# Patient Record
Sex: FEMALE | Race: BLACK OR AFRICAN AMERICAN | NOT HISPANIC OR LATINO | URBAN - METROPOLITAN AREA
[De-identification: names, ages, dates, MRNs, and addresses within clinical notes are randomized per-mention and may not be internally consistent; named-entity substitution may affect disease eponyms.]

---

## 2017-06-12 ENCOUNTER — EMERGENCY (EMERGENCY)
Facility: HOSPITAL | Age: 82
LOS: 1 days | Discharge: HOME | End: 2017-06-12
Attending: EMERGENCY MEDICINE | Admitting: EMERGENCY MEDICINE
Payer: MEDICARE

## 2017-06-12 VITALS
DIASTOLIC BLOOD PRESSURE: 78 MMHG | TEMPERATURE: 98 F | SYSTOLIC BLOOD PRESSURE: 182 MMHG | OXYGEN SATURATION: 95 % | WEIGHT: 134.92 LBS | HEART RATE: 54 BPM | RESPIRATION RATE: 16 BRPM

## 2017-06-12 DIAGNOSIS — R42 DIZZINESS AND GIDDINESS: ICD-10-CM

## 2017-06-12 DIAGNOSIS — R35.0 FREQUENCY OF MICTURITION: ICD-10-CM

## 2017-06-12 DIAGNOSIS — Z79.899 OTHER LONG TERM (CURRENT) DRUG THERAPY: ICD-10-CM

## 2017-06-12 DIAGNOSIS — I10 ESSENTIAL (PRIMARY) HYPERTENSION: ICD-10-CM

## 2017-06-12 DIAGNOSIS — E11.9 TYPE 2 DIABETES MELLITUS WITHOUT COMPLICATIONS: ICD-10-CM

## 2017-06-12 DIAGNOSIS — Z90.710 ACQUIRED ABSENCE OF BOTH CERVIX AND UTERUS: Chronic | ICD-10-CM

## 2017-06-12 PROCEDURE — 93010 ELECTROCARDIOGRAM REPORT: CPT

## 2017-06-12 PROCEDURE — 71010: CPT | Mod: 26

## 2017-06-12 PROCEDURE — 99285 EMERGENCY DEPT VISIT HI MDM: CPT | Mod: 25

## 2017-06-12 RX ORDER — SODIUM CHLORIDE 9 MG/ML
3 INJECTION INTRAMUSCULAR; INTRAVENOUS; SUBCUTANEOUS ONCE
Qty: 0 | Refills: 0 | Status: COMPLETED | OUTPATIENT
Start: 2017-06-12 | End: 2017-06-12

## 2017-06-12 NOTE — ED ADULT NURSE NOTE - OBJECTIVE STATEMENT
Patient brought to the ED because of lightheadedness for the past 2 days, associated with weakness, patient denies chest pain, SOB, numbness tingling in extremities.

## 2017-06-13 VITALS
TEMPERATURE: 98 F | DIASTOLIC BLOOD PRESSURE: 75 MMHG | HEART RATE: 56 BPM | RESPIRATION RATE: 16 BRPM | SYSTOLIC BLOOD PRESSURE: 176 MMHG | OXYGEN SATURATION: 98 %

## 2017-06-13 LAB
ALBUMIN SERPL ELPH-MCNC: 3.7 G/DL — SIGNIFICANT CHANGE UP (ref 3.3–5)
ALP SERPL-CCNC: 64 U/L — SIGNIFICANT CHANGE UP (ref 40–120)
ALT FLD-CCNC: 14 U/L — SIGNIFICANT CHANGE UP (ref 10–45)
ANION GAP SERPL CALC-SCNC: 15 MMOL/L — SIGNIFICANT CHANGE UP (ref 5–17)
APPEARANCE UR: CLEAR — SIGNIFICANT CHANGE UP
APTT BLD: 28.7 SEC — SIGNIFICANT CHANGE UP (ref 27.5–37.4)
AST SERPL-CCNC: 33 U/L — SIGNIFICANT CHANGE UP (ref 10–40)
BASOPHILS NFR BLD AUTO: 0.4 % — SIGNIFICANT CHANGE UP (ref 0–2)
BILIRUB SERPL-MCNC: 0.3 MG/DL — SIGNIFICANT CHANGE UP (ref 0.2–1.2)
BILIRUB UR-MCNC: NEGATIVE — SIGNIFICANT CHANGE UP
BUN SERPL-MCNC: 19 MG/DL — SIGNIFICANT CHANGE UP (ref 7–23)
CALCIUM SERPL-MCNC: 8.9 MG/DL — SIGNIFICANT CHANGE UP (ref 8.4–10.5)
CHLORIDE SERPL-SCNC: 104 MMOL/L — SIGNIFICANT CHANGE UP (ref 96–108)
CK SERPL-CCNC: 87 U/L — SIGNIFICANT CHANGE UP (ref 25–170)
CO2 SERPL-SCNC: 22 MMOL/L — SIGNIFICANT CHANGE UP (ref 22–31)
COLOR SPEC: YELLOW — SIGNIFICANT CHANGE UP
CREAT SERPL-MCNC: 1 MG/DL — SIGNIFICANT CHANGE UP (ref 0.5–1.3)
DIFF PNL FLD: (no result)
EOSINOPHIL NFR BLD AUTO: 2.9 % — SIGNIFICANT CHANGE UP (ref 0–6)
GLUCOSE SERPL-MCNC: 103 MG/DL — HIGH (ref 70–99)
GLUCOSE UR QL: NEGATIVE — SIGNIFICANT CHANGE UP
HCT VFR BLD CALC: 39 % — SIGNIFICANT CHANGE UP (ref 34.5–45)
HGB BLD-MCNC: 12.4 G/DL — SIGNIFICANT CHANGE UP (ref 11.5–15.5)
INR BLD: 0.99 — SIGNIFICANT CHANGE UP (ref 0.88–1.16)
KETONES UR-MCNC: NEGATIVE — SIGNIFICANT CHANGE UP
LEUKOCYTE ESTERASE UR-ACNC: (no result)
LIDOCAIN IGE QN: 91 U/L — HIGH (ref 7–60)
LYMPHOCYTES # BLD AUTO: 35.2 % — SIGNIFICANT CHANGE UP (ref 13–44)
MCHC RBC-ENTMCNC: 28.4 PG — SIGNIFICANT CHANGE UP (ref 27–34)
MCHC RBC-ENTMCNC: 31.8 G/DL — LOW (ref 32–36)
MCV RBC AUTO: 89.4 FL — SIGNIFICANT CHANGE UP (ref 80–100)
MONOCYTES NFR BLD AUTO: 9.9 % — SIGNIFICANT CHANGE UP (ref 2–14)
NEUTROPHILS NFR BLD AUTO: 51.6 % — SIGNIFICANT CHANGE UP (ref 43–77)
NITRITE UR-MCNC: NEGATIVE — SIGNIFICANT CHANGE UP
NT-PROBNP SERPL-SCNC: 846 PG/ML — HIGH (ref 0–300)
PH UR: 5 — SIGNIFICANT CHANGE UP (ref 5–8)
PLATELET # BLD AUTO: 270 K/UL — SIGNIFICANT CHANGE UP (ref 150–400)
POTASSIUM SERPL-MCNC: 4.4 MMOL/L — SIGNIFICANT CHANGE UP (ref 3.5–5.3)
POTASSIUM SERPL-SCNC: 4.4 MMOL/L — SIGNIFICANT CHANGE UP (ref 3.5–5.3)
PROT SERPL-MCNC: 8.7 G/DL — HIGH (ref 6–8.3)
PROT UR-MCNC: (no result) MG/DL
PROTHROM AB SERPL-ACNC: 11 SEC — SIGNIFICANT CHANGE UP (ref 9.8–12.7)
RBC # BLD: 4.36 M/UL — SIGNIFICANT CHANGE UP (ref 3.8–5.2)
RBC # FLD: 14.7 % — SIGNIFICANT CHANGE UP (ref 10.3–16.9)
SODIUM SERPL-SCNC: 141 MMOL/L — SIGNIFICANT CHANGE UP (ref 135–145)
SP GR SPEC: 1.01 — SIGNIFICANT CHANGE UP (ref 1–1.03)
TROPONIN T SERPL-MCNC: <0.01 NG/ML — SIGNIFICANT CHANGE UP (ref 0–0.01)
UROBILINOGEN FLD QL: 0.2 E.U./DL — SIGNIFICANT CHANGE UP
WBC # BLD: 5.1 K/UL — SIGNIFICANT CHANGE UP (ref 3.8–10.5)
WBC # FLD AUTO: 5.1 K/UL — SIGNIFICANT CHANGE UP (ref 3.8–10.5)

## 2017-06-13 PROCEDURE — 80053 COMPREHEN METABOLIC PANEL: CPT

## 2017-06-13 PROCEDURE — 84484 ASSAY OF TROPONIN QUANT: CPT

## 2017-06-13 PROCEDURE — 85025 COMPLETE CBC W/AUTO DIFF WBC: CPT

## 2017-06-13 PROCEDURE — 83880 ASSAY OF NATRIURETIC PEPTIDE: CPT

## 2017-06-13 PROCEDURE — 71045 X-RAY EXAM CHEST 1 VIEW: CPT

## 2017-06-13 PROCEDURE — 82550 ASSAY OF CK (CPK): CPT

## 2017-06-13 PROCEDURE — 85730 THROMBOPLASTIN TIME PARTIAL: CPT

## 2017-06-13 PROCEDURE — 93005 ELECTROCARDIOGRAM TRACING: CPT

## 2017-06-13 PROCEDURE — 83690 ASSAY OF LIPASE: CPT

## 2017-06-13 PROCEDURE — 36415 COLL VENOUS BLD VENIPUNCTURE: CPT

## 2017-06-13 PROCEDURE — 81001 URINALYSIS AUTO W/SCOPE: CPT

## 2017-06-13 PROCEDURE — 85610 PROTHROMBIN TIME: CPT

## 2017-06-13 PROCEDURE — 99284 EMERGENCY DEPT VISIT MOD MDM: CPT | Mod: 25

## 2017-06-13 RX ORDER — NITROFURANTOIN MACROCRYSTAL 50 MG
100 CAPSULE ORAL ONCE
Qty: 0 | Refills: 0 | Status: COMPLETED | OUTPATIENT
Start: 2017-06-13 | End: 2017-06-13

## 2017-06-13 RX ORDER — NITROFURANTOIN MACROCRYSTAL 50 MG
1 CAPSULE ORAL
Qty: 14 | Refills: 0
Start: 2017-06-13 | End: 2017-06-20

## 2017-06-13 RX ADMIN — Medication 100 MILLIGRAM(S): at 02:17

## 2017-06-13 RX ADMIN — SODIUM CHLORIDE 3 MILLILITER(S): 9 INJECTION INTRAMUSCULAR; INTRAVENOUS; SUBCUTANEOUS at 00:55

## 2017-06-13 NOTE — ED PROVIDER NOTE - NEUROLOGICAL, MLM
Alert and oriented, no focal deficits, no motor or sensory deficits. cn grossly intact, strength equal in all 4 ext, finger taps/foot taps equal symmetric, pt ambulates with steady gait, neg romberg, sensation intact to light touch f/a/l

## 2017-06-13 NOTE — ED PROVIDER NOTE - OBJECTIVE STATEMENT
86F presenting with intermittent dizziness, no nausea, no vomiting, no chest pain, no shortness of breath. States she feels intermittently off balance, not currently. Legally blind. No actual fainting, no actual syncope, sx for last 1-2 days. States she feels fine now, wants to go home.

## 2017-06-13 NOTE — ED PROVIDER NOTE - PROGRESS NOTE DETAILS
Pt ambulates with steady gait, feels fine desires to be discharged agrees to f/u with outpt neuro, cards. Daughter also expresses desire for dc, pt feels fine. Pt ambulates with steady gait with daughter's assistance (usually uses cane), feels fine desires to be discharged agrees to f/u with outpt neuro, cards. Daughter also expresses desire for dc, pt feels fine.

## 2023-04-25 ENCOUNTER — INPATIENT (INPATIENT)
Facility: HOSPITAL | Age: 88
LOS: 3 days | Discharge: HOME HEALTH | DRG: 291 | End: 2023-04-29
Attending: INTERNAL MEDICINE | Admitting: INTERNAL MEDICINE
Payer: MEDICARE

## 2023-04-25 VITALS
TEMPERATURE: 98 F | HEIGHT: 59 IN | HEART RATE: 72 BPM | WEIGHT: 110.89 LBS | OXYGEN SATURATION: 95 % | RESPIRATION RATE: 87 BRPM | DIASTOLIC BLOOD PRESSURE: 87 MMHG | SYSTOLIC BLOOD PRESSURE: 216 MMHG

## 2023-04-25 DIAGNOSIS — I50.9 HEART FAILURE, UNSPECIFIED: ICD-10-CM

## 2023-04-25 DIAGNOSIS — Z90.710 ACQUIRED ABSENCE OF BOTH CERVIX AND UTERUS: Chronic | ICD-10-CM

## 2023-04-25 DIAGNOSIS — I16.1 HYPERTENSIVE EMERGENCY: ICD-10-CM

## 2023-04-25 DIAGNOSIS — E78.5 HYPERLIPIDEMIA, UNSPECIFIED: ICD-10-CM

## 2023-04-25 DIAGNOSIS — R79.89 OTHER SPECIFIED ABNORMAL FINDINGS OF BLOOD CHEMISTRY: ICD-10-CM

## 2023-04-25 DIAGNOSIS — E11.9 TYPE 2 DIABETES MELLITUS WITHOUT COMPLICATIONS: ICD-10-CM

## 2023-04-25 DIAGNOSIS — D64.9 ANEMIA, UNSPECIFIED: ICD-10-CM

## 2023-04-25 PROBLEM — R01.1 CARDIAC MURMUR, UNSPECIFIED: Chronic | Status: ACTIVE | Noted: 2017-06-12

## 2023-04-25 PROBLEM — I10 ESSENTIAL (PRIMARY) HYPERTENSION: Chronic | Status: ACTIVE | Noted: 2017-06-12

## 2023-04-25 LAB
ANION GAP SERPL CALC-SCNC: 11 MMOL/L — SIGNIFICANT CHANGE UP (ref 5–17)
APTT BLD: 29.7 SEC — SIGNIFICANT CHANGE UP (ref 27.5–35.5)
BASOPHILS # BLD AUTO: 0.04 K/UL — SIGNIFICANT CHANGE UP (ref 0–0.2)
BASOPHILS NFR BLD AUTO: 0.8 % — SIGNIFICANT CHANGE UP (ref 0–2)
BUN SERPL-MCNC: 20 MG/DL — SIGNIFICANT CHANGE UP (ref 7–23)
CALCIUM SERPL-MCNC: 8.6 MG/DL — SIGNIFICANT CHANGE UP (ref 8.4–10.5)
CHLORIDE SERPL-SCNC: 108 MMOL/L — SIGNIFICANT CHANGE UP (ref 96–108)
CO2 SERPL-SCNC: 22 MMOL/L — SIGNIFICANT CHANGE UP (ref 22–31)
CREAT SERPL-MCNC: 1.73 MG/DL — HIGH (ref 0.5–1.3)
D DIMER BLD IA.RAPID-MCNC: 1334 NG/ML DDU — HIGH
EGFR: 27 ML/MIN/1.73M2 — LOW
EOSINOPHIL # BLD AUTO: 0.19 K/UL — SIGNIFICANT CHANGE UP (ref 0–0.5)
EOSINOPHIL NFR BLD AUTO: 3.9 % — SIGNIFICANT CHANGE UP (ref 0–6)
GLUCOSE BLDC GLUCOMTR-MCNC: 156 MG/DL — HIGH (ref 70–99)
GLUCOSE SERPL-MCNC: 116 MG/DL — HIGH (ref 70–99)
HCT VFR BLD CALC: 31.5 % — LOW (ref 34.5–45)
HGB BLD-MCNC: 10 G/DL — LOW (ref 11.5–15.5)
IMM GRANULOCYTES NFR BLD AUTO: 0.2 % — SIGNIFICANT CHANGE UP (ref 0–0.9)
INR BLD: 0.97 — SIGNIFICANT CHANGE UP (ref 0.88–1.16)
LYMPHOCYTES # BLD AUTO: 1.21 K/UL — SIGNIFICANT CHANGE UP (ref 1–3.3)
LYMPHOCYTES # BLD AUTO: 24.6 % — SIGNIFICANT CHANGE UP (ref 13–44)
MAGNESIUM SERPL-MCNC: 1.7 MG/DL — SIGNIFICANT CHANGE UP (ref 1.6–2.6)
MCHC RBC-ENTMCNC: 31.7 GM/DL — LOW (ref 32–36)
MCHC RBC-ENTMCNC: 32.7 PG — SIGNIFICANT CHANGE UP (ref 27–34)
MCV RBC AUTO: 102.9 FL — HIGH (ref 80–100)
MONOCYTES # BLD AUTO: 0.55 K/UL — SIGNIFICANT CHANGE UP (ref 0–0.9)
MONOCYTES NFR BLD AUTO: 11.2 % — SIGNIFICANT CHANGE UP (ref 2–14)
NEUTROPHILS # BLD AUTO: 2.92 K/UL — SIGNIFICANT CHANGE UP (ref 1.8–7.4)
NEUTROPHILS NFR BLD AUTO: 59.3 % — SIGNIFICANT CHANGE UP (ref 43–77)
NRBC # BLD: 0 /100 WBCS — SIGNIFICANT CHANGE UP (ref 0–0)
NT-PROBNP SERPL-SCNC: HIGH PG/ML (ref 0–300)
PLATELET # BLD AUTO: 282 K/UL — SIGNIFICANT CHANGE UP (ref 150–400)
POTASSIUM SERPL-MCNC: 3.9 MMOL/L — SIGNIFICANT CHANGE UP (ref 3.5–5.3)
POTASSIUM SERPL-SCNC: 3.9 MMOL/L — SIGNIFICANT CHANGE UP (ref 3.5–5.3)
PROTHROM AB SERPL-ACNC: 11.5 SEC — SIGNIFICANT CHANGE UP (ref 10.5–13.4)
RBC # BLD: 3.06 M/UL — LOW (ref 3.8–5.2)
RBC # FLD: 15.1 % — HIGH (ref 10.3–14.5)
SODIUM SERPL-SCNC: 141 MMOL/L — SIGNIFICANT CHANGE UP (ref 135–145)
TROPONIN T SERPL-MCNC: 0.01 NG/ML — SIGNIFICANT CHANGE UP (ref 0–0.01)
WBC # BLD: 4.92 K/UL — SIGNIFICANT CHANGE UP (ref 3.8–10.5)
WBC # FLD AUTO: 4.92 K/UL — SIGNIFICANT CHANGE UP (ref 3.8–10.5)

## 2023-04-25 PROCEDURE — G1004: CPT

## 2023-04-25 PROCEDURE — 99285 EMERGENCY DEPT VISIT HI MDM: CPT

## 2023-04-25 PROCEDURE — 71275 CT ANGIOGRAPHY CHEST: CPT | Mod: 26,MG

## 2023-04-25 PROCEDURE — 71045 X-RAY EXAM CHEST 1 VIEW: CPT | Mod: 26

## 2023-04-25 RX ORDER — ASPIRIN/CALCIUM CARB/MAGNESIUM 324 MG
1 TABLET ORAL
Qty: 0 | Refills: 0 | DISCHARGE

## 2023-04-25 RX ORDER — DEXTROSE 50 % IN WATER 50 %
25 SYRINGE (ML) INTRAVENOUS ONCE
Refills: 0 | Status: DISCONTINUED | OUTPATIENT
Start: 2023-04-25 | End: 2023-04-29

## 2023-04-25 RX ORDER — POTASSIUM CHLORIDE 20 MEQ
20 PACKET (EA) ORAL ONCE
Refills: 0 | Status: COMPLETED | OUTPATIENT
Start: 2023-04-25 | End: 2023-04-25

## 2023-04-25 RX ORDER — BRIMONIDINE TARTRATE 2 MG/MG
1 SOLUTION/ DROPS OPHTHALMIC
Refills: 0 | DISCHARGE

## 2023-04-25 RX ORDER — HYDRALAZINE HCL 50 MG
25 TABLET ORAL ONCE
Refills: 0 | Status: COMPLETED | OUTPATIENT
Start: 2023-04-25 | End: 2023-04-25

## 2023-04-25 RX ORDER — ATORVASTATIN CALCIUM 80 MG/1
10 TABLET, FILM COATED ORAL AT BEDTIME
Refills: 0 | Status: DISCONTINUED | OUTPATIENT
Start: 2023-04-26 | End: 2023-04-29

## 2023-04-25 RX ORDER — LATANOPROST 0.05 MG/ML
1 SOLUTION/ DROPS OPHTHALMIC; TOPICAL AT BEDTIME
Refills: 0 | Status: DISCONTINUED | OUTPATIENT
Start: 2023-04-25 | End: 2023-04-29

## 2023-04-25 RX ORDER — FUROSEMIDE 40 MG
20 TABLET ORAL
Refills: 0 | Status: DISCONTINUED | OUTPATIENT
Start: 2023-04-25 | End: 2023-04-27

## 2023-04-25 RX ORDER — DORZOLAMIDE HYDROCHLORIDE TIMOLOL MALEATE 20; 5 MG/ML; MG/ML
1 SOLUTION/ DROPS OPHTHALMIC
Refills: 0 | Status: DISCONTINUED | OUTPATIENT
Start: 2023-04-25 | End: 2023-04-29

## 2023-04-25 RX ORDER — HYDRALAZINE HCL 50 MG
1 TABLET ORAL
Refills: 0 | DISCHARGE

## 2023-04-25 RX ORDER — GLUCAGON INJECTION, SOLUTION 0.5 MG/.1ML
1 INJECTION, SOLUTION SUBCUTANEOUS ONCE
Refills: 0 | Status: DISCONTINUED | OUTPATIENT
Start: 2023-04-25 | End: 2023-04-29

## 2023-04-25 RX ORDER — BRIMONIDINE TARTRATE 2 MG/MG
1 SOLUTION/ DROPS OPHTHALMIC
Refills: 0 | Status: DISCONTINUED | OUTPATIENT
Start: 2023-04-25 | End: 2023-04-29

## 2023-04-25 RX ORDER — SITAGLIPTIN AND METFORMIN HYDROCHLORIDE 500; 50 MG/1; MG/1
1 TABLET, FILM COATED ORAL
Qty: 0 | Refills: 0 | DISCHARGE

## 2023-04-25 RX ORDER — AMLODIPINE BESYLATE 2.5 MG/1
10 TABLET ORAL DAILY
Refills: 0 | Status: DISCONTINUED | OUTPATIENT
Start: 2023-04-26 | End: 2023-04-29

## 2023-04-25 RX ORDER — DEXTROSE 50 % IN WATER 50 %
12.5 SYRINGE (ML) INTRAVENOUS ONCE
Refills: 0 | Status: DISCONTINUED | OUTPATIENT
Start: 2023-04-25 | End: 2023-04-29

## 2023-04-25 RX ORDER — FUROSEMIDE 40 MG
40 TABLET ORAL ONCE
Refills: 0 | Status: COMPLETED | OUTPATIENT
Start: 2023-04-25 | End: 2023-04-25

## 2023-04-25 RX ORDER — HYDRALAZINE HCL 50 MG
25 TABLET ORAL THREE TIMES A DAY
Refills: 0 | Status: DISCONTINUED | OUTPATIENT
Start: 2023-04-25 | End: 2023-04-27

## 2023-04-25 RX ORDER — SODIUM CHLORIDE 9 MG/ML
1000 INJECTION, SOLUTION INTRAVENOUS
Refills: 0 | Status: DISCONTINUED | OUTPATIENT
Start: 2023-04-25 | End: 2023-04-29

## 2023-04-25 RX ORDER — NEBIVOLOL HYDROCHLORIDE 5 MG/1
10 TABLET ORAL DAILY
Refills: 0 | Status: DISCONTINUED | OUTPATIENT
Start: 2023-04-26 | End: 2023-04-27

## 2023-04-25 RX ORDER — INSULIN LISPRO 100/ML
VIAL (ML) SUBCUTANEOUS
Refills: 0 | Status: DISCONTINUED | OUTPATIENT
Start: 2023-04-25 | End: 2023-04-29

## 2023-04-25 RX ORDER — BIMATOPROST 0.3 MG/ML
1 SOLUTION/ DROPS OPHTHALMIC
Refills: 0 | DISCHARGE

## 2023-04-25 RX ORDER — LABETALOL HCL 100 MG
10 TABLET ORAL ONCE
Refills: 0 | Status: COMPLETED | OUTPATIENT
Start: 2023-04-25 | End: 2023-04-25

## 2023-04-25 RX ORDER — LOVASTATIN 20 MG
1 TABLET ORAL
Qty: 0 | Refills: 0 | DISCHARGE

## 2023-04-25 RX ORDER — HEPARIN SODIUM 5000 [USP'U]/ML
5000 INJECTION INTRAVENOUS; SUBCUTANEOUS EVERY 8 HOURS
Refills: 0 | Status: DISCONTINUED | OUTPATIENT
Start: 2023-04-25 | End: 2023-04-26

## 2023-04-25 RX ORDER — AMLODIPINE BESYLATE 2.5 MG/1
10 TABLET ORAL ONCE
Refills: 0 | Status: COMPLETED | OUTPATIENT
Start: 2023-04-25 | End: 2023-04-25

## 2023-04-25 RX ORDER — DORZOLAMIDE HYDROCHLORIDE TIMOLOL MALEATE 20; 5 MG/ML; MG/ML
1 SOLUTION/ DROPS OPHTHALMIC
Refills: 0 | DISCHARGE

## 2023-04-25 RX ORDER — DEXTROSE 50 % IN WATER 50 %
15 SYRINGE (ML) INTRAVENOUS ONCE
Refills: 0 | Status: DISCONTINUED | OUTPATIENT
Start: 2023-04-25 | End: 2023-04-29

## 2023-04-25 RX ORDER — HYDRALAZINE HCL 50 MG
10 TABLET ORAL ONCE
Refills: 0 | Status: COMPLETED | OUTPATIENT
Start: 2023-04-25 | End: 2023-04-25

## 2023-04-25 RX ADMIN — Medication 10 MILLIGRAM(S): at 19:29

## 2023-04-25 RX ADMIN — Medication 25 MILLIGRAM(S): at 21:50

## 2023-04-25 RX ADMIN — Medication 10 MILLIGRAM(S): at 15:49

## 2023-04-25 RX ADMIN — Medication 40 MILLIGRAM(S): at 15:00

## 2023-04-25 RX ADMIN — Medication 10 MILLIGRAM(S): at 13:55

## 2023-04-25 RX ADMIN — Medication 2: at 23:04

## 2023-04-25 RX ADMIN — Medication 20 MILLIEQUIVALENT(S): at 22:24

## 2023-04-25 RX ADMIN — BRIMONIDINE TARTRATE 1 DROP(S): 2 SOLUTION/ DROPS OPHTHALMIC at 23:56

## 2023-04-25 RX ADMIN — HEPARIN SODIUM 5000 UNIT(S): 5000 INJECTION INTRAVENOUS; SUBCUTANEOUS at 22:25

## 2023-04-25 RX ADMIN — AMLODIPINE BESYLATE 10 MILLIGRAM(S): 2.5 TABLET ORAL at 14:27

## 2023-04-25 RX ADMIN — Medication 20 MILLIGRAM(S): at 22:26

## 2023-04-25 RX ADMIN — Medication 25 MILLIGRAM(S): at 22:40

## 2023-04-25 RX ADMIN — LATANOPROST 1 DROP(S): 0.05 SOLUTION/ DROPS OPHTHALMIC; TOPICAL at 23:49

## 2023-04-25 NOTE — ED ADULT NURSE NOTE - OBJECTIVE STATEMENT
89F PMH HTN, DM and heart murmur presents c/o SOB, generalized body aches and weakness, increase in urinary frequency, chills and "I can feel my heart beating" x days. pt states recently she has been having to sleep sitting up or with multiple pillows. no known hx of CHF. no edema noted to LE. denies CP, dizziness, headache, hematuria, dysuria, or cough/congestion. pt speaking in clear, complete sentences. RR easy, even, un-labored on room air. pt placed in hospital gown. EKG completed in triage. pending provider evaluation

## 2023-04-25 NOTE — H&P ADULT - PROBLEM SELECTOR PLAN 1
Presenting with SOB, +JVD, + bilateral pleural effusions, satting 95% on RA, however, BNP: 70834  - Never has had hx of CHF in past, but daughter states that she has a heart murmur  - Patient w/ htn emergency - likely driving heart failure   - ECHO ordered. f/u results   - CTA PE Protocol 4/25/23:   Cardiomegaly with bilateral pleural effusions, a mosaic attenuation pattern in the lungs, and interlobular septal thickening. These findings are suspicious for congestive heart failure.  Dilated main pulmonary artery, a finding which may be seen with pulmonary hypertension.   - EKG: NSR @ 65 bpm, ST depressions in I.   - DIURESIS: s/p Lasix 40 mg IVP x 1 in ED; continue Lasix 20 mg IV BID (lasix naive)  - GDMT: continue hydralazine 25 mg TID, consider Isordil   - Core measures, Strict I's and O's, fluid restriction Presenting with SOB, +JVD, + bilateral pleural effusions, satting 95% on RA, however, BNP: 68161  - Never has had hx of CHF in past, but daughter states that she has a heart murmur  - Patient w/ htn emergency - likely driving heart failure   - ECHO ordered. f/u results   - CTA PE Protocol 4/25/23:   Cardiomegaly with bilateral pleural effusions, a mosaic attenuation pattern in the lungs, and interlobular septal thickening. These findings are suspicious for congestive heart failure.  Dilated main pulmonary artery, a finding which may be seen with pulmonary hypertension.   - EKG: NSR @ 65 bpm, ST depressions in I.   - DIURESIS: s/p Lasix 40 mg IVP x 1 in ED; continue Lasix 20 mg IV BID (lasix naive)  - GDMT: continue hydralazine 25 mg TID, consider Isordil, cont nebivolol 10 mg (home dose 20 - consider switching to metop/coreg)   - Core measures, Strict I's and O's, fluid restriction

## 2023-04-25 NOTE — ED PROVIDER NOTE - ATTENDING APP SHARED VISIT CONTRIBUTION OF CARE
89 HTN HLD preDM now with sob x 2 weeks.  Recently had antihypertensive meds changed.  No lightheadedness.  No cough, fever, chills, no chest pain.  220/120 in ED.  Clear lungs, non labored, no edema.  Labetolol given and improved.  EKG nonischemic.  DDimer elevated.  Pending BNP.  Plan labs, CT PE and reassess.  CXR clear.

## 2023-04-25 NOTE — H&P ADULT - ASSESSMENT
89 y/o F, w/ PMHx of HTN, HLD, DM (however not on meds), who presented to [facility] ED on 4/25/23 with her daughter, with complaints of worsening shortness of breath over the past week. Patient saw her PMD, Dr. Beyer, 2 weeks ago, was told to start hydralazine 25 mg TID for uncontrolled htn. Patient thought that she was supposed to stop her amlodipine-valsartan  mg when her daughter thinks she was supposed to take both. Since the med switch, patient has been feeling SOB and noticed her BP high, has used 2 pillows to sleep, SOB worse with exertion, and occasional chest pressure. In the ED, patient overloaded on exam, CT chest with b/l pleural effusions, [place] 5[address]. Patient 's BP elevated to 216/87 mmHg. Patient admitted to cardiology/telemetry for hypertensive emergency and acute heart failure.

## 2023-04-25 NOTE — ED PROVIDER NOTE - NS ED ATTENDING STATEMENT MOD
This was a shared visit with the LUCIUS. I reviewed and verified the documentation and independently performed the documented:

## 2023-04-25 NOTE — H&P ADULT - HISTORY OF PRESENT ILLNESS
89 y/o F, w/ PMHx of HTN, HLD, DM, who presented to [facility] ED on 4/25/23 with her daughter, with complaints of worsening shortness of breath over the past week. Patient saw her PMD,  _____ 2 weeks ago, was told to stop her norvasc 10 mg and take hydrochlorothiazide instead. Since the med switch, patient has been feeling SOB and noticed her BP high, has used 2 pillows to sleep, SOB worse with exertion, and occasional chest pressure. Patient denies hx of CHF. Patient denies LE edema, syncope, dizziness, headache, fever, chills, palpitations, n/v/d, abdominal pain, recent travel, or sickness.     In the ED, VS: T: 98.1 F, BP: 216/87 mmHg, RR: 16 breaths/min, spO2: 98% on RA  Labs significant for: Hgb/Hct: 10.0/31.5, D-dimer: 1334, K: 3.9, BUN/Cr: 20/1.73, BNP: 16526, CXR: New bilateral infiltrates or congestion. Small left pleural   effusion. Troponin: 0.01; CTA PE Protocol 4/25/23: . No evidence of acute pulmonary embolism.  Cardiomegaly with bilateral pleural effusions, a mosaic attenuation pattern in the lungs, and interlobular septal thickening. These findings are suspicious for congestive heart failure.  Dilated main pulmonary artery, a finding which may be seen with pulmonary hypertension.  A 1.8 cm cystic lesion in the pancreatic head is partially visualized. Ut could represent a cystic neoplasm, such as a branch duct type intraductal papillary mucinous neoplasm. If indicated, an MRI of the pancreas could be used for further evaluation. EKG: NSR @ 65 bpm, ST depressions in I.   Patient received: Amlodipine 10 mg PO x 1, Lasix 40 mg IVP x 1, Labetalol 10 mg IVP x2 89 y/o F, w/ PMHx of HTN, HLD, DM (however not on meds), who presented to [facility] ED on 4/25/23 with her daughter, with complaints of worsening shortness of breath over the past week. Patient saw her PMD, Dr. Beyer, 2 weeks ago, was told to start hydralazine 25 mg TID for uncontrolled htn. Patient thought that she was supposed to stop her amlodipine-valsartan  mg when her daughter thinks she was supposed to take both. Since the med switch, patient has been feeling SOB and noticed her BP high, has used 2 pillows to sleep, SOB worse with exertion, and occasional chest pressure. Patient denies hx of CHF, but was told she has a heart murmur . Patient denies LE edema, syncope, dizziness, headache, fever, chills, palpitations, n/v/d, abdominal pain, recent travel, or sickness.     In the ED, VS: T: 98.1 F, BP: 216/87 mmHg, RR: 16 breaths/min, spO2: 98% on RA  Labs significant for: Hgb/Hct: 10.0/31.5, D-dimer: 1334, K: 3.9, BUN/Cr: 20/1.73, BNP: 52057, CXR: New bilateral infiltrates or congestion. Small left pleural   effusion. Troponin: 0.01; CTA PE Protocol 4/25/23: . No evidence of acute pulmonary embolism.  Cardiomegaly with bilateral pleural effusions, a mosaic attenuation pattern in the lungs, and interlobular septal thickening. These findings are suspicious for congestive heart failure.  Dilated main pulmonary artery, a finding which may be seen with pulmonary hypertension.  A 1.8 cm cystic lesion in the pancreatic head is partially visualized. It could represent a cystic neoplasm, such as a branch duct type intraductal papillary mucinous neoplasm. If indicated, an MRI of the pancreas could be used for further evaluation. EKG: NSR @ 65 bpm, ST depressions in I.   Patient received: Amlodipine 10 mg PO x 1, Lasix 40 mg IVP x 1, Labetalol 10 mg IVP x2

## 2023-04-25 NOTE — H&P ADULT - PROBLEM SELECTOR PLAN 4
No known hx of CKD, BUN/Cr: 20/1.73  - likely cardiorenal congestion, continue diuresis  - F/u urine lytes  - holding valsartan 320 mg daily   - consider renal consult

## 2023-04-25 NOTE — H&P ADULT - PROBLEM SELECTOR PLAN 2
Presenting in acute heart failure and AUGUSTUS and hypertensive w/ SBP to 200s  - S/P Amlodipine 10 mg PO x 1 in ED, Labetalol 10 mg IVP x 2, Lasix 40 mg IVP x 1   - Will give Hydralazine 10 mg IVP x 1, continue diuresis with lasix 20 mg IVP BID (lasix naive) and start Hydralazine 25 mg TID   - Goal for SBP 160s-170s Presenting in acute heart failure and AUGUSTUS and hypertensive w/ SBP to 200s  - S/P Amlodipine 10 mg PO x 1 in ED, Labetalol 10 mg IVP x 2, Lasix 40 mg IVP x 1   - Will give Hydralazine 10 mg IVP x 1  -  continue diuresis with lasix 20 mg IVP BID (lasix naive)   - Continue nebivolol 10 mg daily (home med), amlodipine 10 mg daily (home med) and  Hydralazine 25 mg TID   - Goal for SBP 160s-170s

## 2023-04-25 NOTE — PATIENT PROFILE ADULT - FALL HARM RISK - HARM RISK INTERVENTIONS
Assistance with ambulation/Assistance OOB with selected safe patient handling equipment/Communicate Risk of Fall with Harm to all staff/Discuss with provider need for PT consult/Monitor gait and stability/Provide patient with walking aids - walker, cane, crutches/Reinforce activity limits and safety measures with patient and family/Reorient to person, place and time as needed/Sit up slowly, dangle for a short time, stand at bedside before walking/Tailored Fall Risk Interventions/Use of alarms - bed, chair and/or voice tab/Visual Cue: Yellow wristband and red socks/Bed in lowest position, wheels locked, appropriate side rails in place/Call bell, personal items and telephone in reach/Instruct patient to call for assistance before getting out of bed or chair/Non-slip footwear when patient is out of bed/Bryan to call system/Physically safe environment - no spills, clutter or unnecessary equipment/Purposeful Proactive Rounding/Room/bathroom lighting operational, light cord in reach

## 2023-04-25 NOTE — H&P ADULT - NSHPADDITIONALINFOADULT_GEN_ALL_CORE
Attending Attestation:  I was physically present for the key portions of the evaluation and management (E/M) service provided.  I agree with the above history, physical, and plan which I have reviewed with the following edits/addendum:    89F w HTN HLP DMT2 p/w worsening DINH and elevated BPs (216/87). Initial pertinent labs crea 1.7, BNP 13K, hgb 10. CT PE negative, + R pleural effusion. ECG sinus, non ischemic. Exam w significant long crescendo holosystolic murmur heard best in aortic position. JVP ~15 cmH2O, mild leg edema.   - Fe studies  - TTE to evaluate cardiac structure particularly valves  - continue IV diuresis, likely to switch to PO tmw  - HTN mgt: hydral 25 tid, add isordil if remains high (got doses of home meds amlod, nebivolol)     John Ortega MD  Cardiology    50 minutes spent on total encounter; more than 50% of the visit was spent counseling and/or coordinating care by the attending physician. Attending Attestation:  I was physically present for the key portions of the evaluation and management (E/M) service provided.  I agree with the above history, physical, and plan which I have reviewed with the following edits/addendum:    89F w HTN HLP DMT2 p/w worsening DINH and elevated BPs (216/87). Initial pertinent labs crea 1.7, BNP 13K, hgb 10. CT PE negative, + R pleural effusion. ECG sinus, non ischemic. Exam w significant long crescendo holosystolic murmur heard best in aortic position. JVP ~15 cmH2O, mild leg edema.     - TTE to evaluate cardiac structure particularly valves  - continue IV diuresis, likely to switch to PO tmw  - HTN mgt: hydral 25 tid, add isordil if remains high (got doses of home meds amlod, nebivolol)     John Ortega MD  Cardiology    50 minutes spent on total encounter; more than 50% of the visit was spent counseling and/or coordinating care by the attending physician. Attending Attestation:  I was physically present for the key portions of the evaluation and management (E/M) service provided.  I agree with the above history, physical, and plan which I have reviewed with the following edits/addendum:    89F w HTN HLP DMT2 p/w worsening DINH and elevated BPs (216/87). Initial pertinent labs crea 1.7 (baseline ~1 - 2017), BNP 13K, hgb 10. CT PE negative, + R pleural effusion. ECG sinus, non ischemic. Exam w significant long crescendo holosystolic murmur heard best in aortic position. JVP ~15 cmH2O, mild leg edema.     - TTE to evaluate cardiac structure particularly valves-- moderate AS, preserved LVEF, diastolic dysfxn  - continue IV diuresis, likely to switch to PO tmw with better BP control  - HTN mgt: hydral 25 tid, increase dose if needed (got doses of home meds amlod, nebivolol)  - check PRA-iliana level in AM, now that she has been off RAAS meds  - AUGUSTUS: check renal US. Suspect with some baseline renal dysfxn worsened acutely by ADHFpEF, uncontrolled HTN. Obtain records from PMD re baseline crea     John Ortega MD  Cardiology    50 minutes spent on total encounter; more than 50% of the visit was spent counseling and/or coordinating care by the attending physician.

## 2023-04-25 NOTE — H&P ADULT - PROBLEM SELECTOR PLAN 5
F/u a1c, patient not on any home meds at this time   - Continue MISS     F: none   E: K >4, MG >2  N: DASH, TLC, fluid restriction   DVT: heparin   Dispo: pending clinical progression  PT ordered  Patient would like to be FULL CODE F/u a1c  - home med: lovastatin 10 mg --> atorva 10 (TIC)

## 2023-04-25 NOTE — ED PROVIDER NOTE - CARE PLAN
Principal Discharge DX:	SOB (shortness of breath)  Secondary Diagnosis:	Hypertension  Secondary Diagnosis:	Congestive heart failure (CHF)   1

## 2023-04-25 NOTE — ED PROVIDER NOTE - CLINICAL SUMMARY MEDICAL DECISION MAKING FREE TEXT BOX
pt c/o worsening sob over past wk, states that saw pmd 2 wks ago and norvasc was dc'd and hctz started, pt presents hypertensive w/o ha but SOB and worse on exertion, no le edema, labs w/elevated bnp, cxr w/vascular congestion, ddimer elevated even when age adjusted - cta neg pe/dissection, given iv meds for bp control and dose of norvasc, lasix for diuresis, will admit to cards for iv diuresis and bp control, monitoring

## 2023-04-25 NOTE — ED ADULT NURSE NOTE - NSIMPLEMENTINTERV_GEN_ALL_ED
Implemented All Fall with Harm Risk Interventions:  Mamou to call system. Call bell, personal items and telephone within reach. Instruct patient to call for assistance. Room bathroom lighting operational. Non-slip footwear when patient is off stretcher. Physically safe environment: no spills, clutter or unnecessary equipment. Stretcher in lowest position, wheels locked, appropriate side rails in place. Provide visual cue, wrist band, yellow gown, etc. Monitor gait and stability. Monitor for mental status changes and reorient to person, place, and time. Review medications for side effects contributing to fall risk. Reinforce activity limits and safety measures with patient and family. Provide visual clues: red socks.

## 2023-04-25 NOTE — H&P ADULT - PROBLEM SELECTOR PLAN 6
F/u a1c, patient not on any home meds at this time   - Continue MISS     F: none   E: K >4, MG >2  N: DASH, TLC, fluid restriction   DVT: heparin   Dispo: pending clinical progression  PT ordered  Patient would like to be FULL CODE

## 2023-04-25 NOTE — H&P ADULT - PROBLEM SELECTOR PLAN 3
Hgb/Hct: 10.0/31.5  - F/u iron/b12,folate Hgb/Hct: 10.0/31.5  - denies blood hematuria/hematochieza   - F/u iron/b12,folate

## 2023-04-25 NOTE — ED PROVIDER NOTE - OBJECTIVE STATEMENT
The pt is a 89 y/o F, who presents to ED w/daughter, c/o worsening SOB over past wk. PMH HTN, HLD, DM. States that saw her pmd 2 wks and was told to stop norvasc 10mg and take HCTZ instead -- since med switch, started to feel sob and noticed BP to be running high, has been using 2 pillows to sleep, sob worse on exertion, occasional chest pressure. No known hx chf. Denies leg pain or swelling, fevers, chills, dizziness, syncope, palpitations, n/v/d, abd pain.

## 2023-04-26 ENCOUNTER — TRANSCRIPTION ENCOUNTER (OUTPATIENT)
Age: 88
End: 2023-04-26

## 2023-04-26 DIAGNOSIS — Z29.9 ENCOUNTER FOR PROPHYLACTIC MEASURES, UNSPECIFIED: ICD-10-CM

## 2023-04-26 LAB
A1C WITH ESTIMATED AVERAGE GLUCOSE RESULT: 5.7 % — HIGH (ref 4–5.6)
ALBUMIN SERPL ELPH-MCNC: 2.7 G/DL — LOW (ref 3.3–5)
ALP SERPL-CCNC: 64 U/L — SIGNIFICANT CHANGE UP (ref 40–120)
ALT FLD-CCNC: <5 U/L — LOW (ref 10–45)
ANION GAP SERPL CALC-SCNC: 13 MMOL/L — SIGNIFICANT CHANGE UP (ref 5–17)
APPEARANCE UR: CLEAR — SIGNIFICANT CHANGE UP
AST SERPL-CCNC: 19 U/L — SIGNIFICANT CHANGE UP (ref 10–40)
BACTERIA # UR AUTO: PRESENT /HPF
BASOPHILS # BLD AUTO: 0.04 K/UL — SIGNIFICANT CHANGE UP (ref 0–0.2)
BASOPHILS NFR BLD AUTO: 1.1 % — SIGNIFICANT CHANGE UP (ref 0–2)
BILIRUB SERPL-MCNC: 0.4 MG/DL — SIGNIFICANT CHANGE UP (ref 0.2–1.2)
BILIRUB UR-MCNC: NEGATIVE — SIGNIFICANT CHANGE UP
BUN SERPL-MCNC: 22 MG/DL — SIGNIFICANT CHANGE UP (ref 7–23)
CALCIUM SERPL-MCNC: 7.9 MG/DL — LOW (ref 8.4–10.5)
CHLORIDE SERPL-SCNC: 108 MMOL/L — SIGNIFICANT CHANGE UP (ref 96–108)
CHOLEST SERPL-MCNC: 158 MG/DL — SIGNIFICANT CHANGE UP
CO2 SERPL-SCNC: 20 MMOL/L — LOW (ref 22–31)
COLOR SPEC: YELLOW — SIGNIFICANT CHANGE UP
COMMENT - URINE: SIGNIFICANT CHANGE UP
CREAT ?TM UR-MCNC: 33 MG/DL — SIGNIFICANT CHANGE UP
CREAT ?TM UR-MCNC: 53 MG/DL — SIGNIFICANT CHANGE UP
CREAT SERPL-MCNC: 1.74 MG/DL — HIGH (ref 0.5–1.3)
DIFF PNL FLD: NEGATIVE — SIGNIFICANT CHANGE UP
EGFR: 27 ML/MIN/1.73M2 — LOW
EOSINOPHIL # BLD AUTO: 0.15 K/UL — SIGNIFICANT CHANGE UP (ref 0–0.5)
EOSINOPHIL NFR BLD AUTO: 4 % — SIGNIFICANT CHANGE UP (ref 0–6)
EPI CELLS # UR: SIGNIFICANT CHANGE UP /HPF (ref 0–5)
ESTIMATED AVERAGE GLUCOSE: 117 MG/DL — HIGH (ref 68–114)
FERRITIN SERPL-MCNC: 155 NG/ML — HIGH (ref 15–150)
FOLATE SERPL-MCNC: 8.6 NG/ML — SIGNIFICANT CHANGE UP
GLUCOSE BLDC GLUCOMTR-MCNC: 110 MG/DL — HIGH (ref 70–99)
GLUCOSE BLDC GLUCOMTR-MCNC: 134 MG/DL — HIGH (ref 70–99)
GLUCOSE BLDC GLUCOMTR-MCNC: 143 MG/DL — HIGH (ref 70–99)
GLUCOSE BLDC GLUCOMTR-MCNC: 98 MG/DL — SIGNIFICANT CHANGE UP (ref 70–99)
GLUCOSE SERPL-MCNC: 93 MG/DL — SIGNIFICANT CHANGE UP (ref 70–99)
GLUCOSE UR QL: NEGATIVE — SIGNIFICANT CHANGE UP
HCT VFR BLD CALC: 31.4 % — LOW (ref 34.5–45)
HDLC SERPL-MCNC: 65 MG/DL — SIGNIFICANT CHANGE UP
HGB BLD-MCNC: 9.8 G/DL — LOW (ref 11.5–15.5)
HYALINE CASTS # UR AUTO: SIGNIFICANT CHANGE UP /LPF (ref 0–2)
IMM GRANULOCYTES NFR BLD AUTO: 0 % — SIGNIFICANT CHANGE UP (ref 0–0.9)
IRON SATN MFR SERPL: 32 % — SIGNIFICANT CHANGE UP (ref 14–50)
IRON SATN MFR SERPL: 53 UG/DL — SIGNIFICANT CHANGE UP (ref 30–160)
KETONES UR-MCNC: NEGATIVE — SIGNIFICANT CHANGE UP
LEUKOCYTE ESTERASE UR-ACNC: NEGATIVE — SIGNIFICANT CHANGE UP
LIPID PNL WITH DIRECT LDL SERPL: 78 MG/DL — SIGNIFICANT CHANGE UP
LYMPHOCYTES # BLD AUTO: 1.03 K/UL — SIGNIFICANT CHANGE UP (ref 1–3.3)
LYMPHOCYTES # BLD AUTO: 27.8 % — SIGNIFICANT CHANGE UP (ref 13–44)
MAGNESIUM SERPL-MCNC: 1.5 MG/DL — LOW (ref 1.6–2.6)
MCHC RBC-ENTMCNC: 31.2 GM/DL — LOW (ref 32–36)
MCHC RBC-ENTMCNC: 32.5 PG — SIGNIFICANT CHANGE UP (ref 27–34)
MCV RBC AUTO: 104 FL — HIGH (ref 80–100)
MONOCYTES # BLD AUTO: 0.46 K/UL — SIGNIFICANT CHANGE UP (ref 0–0.9)
MONOCYTES NFR BLD AUTO: 12.4 % — SIGNIFICANT CHANGE UP (ref 2–14)
NEUTROPHILS # BLD AUTO: 2.03 K/UL — SIGNIFICANT CHANGE UP (ref 1.8–7.4)
NEUTROPHILS NFR BLD AUTO: 54.7 % — SIGNIFICANT CHANGE UP (ref 43–77)
NITRITE UR-MCNC: NEGATIVE — SIGNIFICANT CHANGE UP
NON HDL CHOLESTEROL: 93 MG/DL — SIGNIFICANT CHANGE UP
NRBC # BLD: 0 /100 WBCS — SIGNIFICANT CHANGE UP (ref 0–0)
OSMOLALITY UR: 375 MOSM/KG — SIGNIFICANT CHANGE UP (ref 300–900)
PH UR: 5 — SIGNIFICANT CHANGE UP (ref 5–8)
PLATELET # BLD AUTO: 264 K/UL — SIGNIFICANT CHANGE UP (ref 150–400)
POTASSIUM SERPL-MCNC: 3.6 MMOL/L — SIGNIFICANT CHANGE UP (ref 3.5–5.3)
POTASSIUM SERPL-SCNC: 3.6 MMOL/L — SIGNIFICANT CHANGE UP (ref 3.5–5.3)
POTASSIUM UR-SCNC: 13 MMOL/L — SIGNIFICANT CHANGE UP
PROT ?TM UR-MCNC: 52 MG/DL — HIGH (ref 0–12)
PROT ?TM UR-MCNC: 71 MG/DL — HIGH (ref 0–12)
PROT SERPL-MCNC: 7.1 G/DL — SIGNIFICANT CHANGE UP (ref 6–8.3)
PROT UR-MCNC: ABNORMAL MG/DL
PROT/CREAT UR-RTO: 1.4 RATIO — HIGH (ref 0–0.2)
PROT/CREAT UR-RTO: 1.6 RATIO — HIGH (ref 0–0.2)
RBC # BLD: 3.02 M/UL — LOW (ref 3.8–5.2)
RBC # FLD: 14.9 % — HIGH (ref 10.3–14.5)
RBC CASTS # UR COMP ASSIST: < 5 /HPF — SIGNIFICANT CHANGE UP
SODIUM SERPL-SCNC: 141 MMOL/L — SIGNIFICANT CHANGE UP (ref 135–145)
SODIUM UR-SCNC: 134 MMOL/L — SIGNIFICANT CHANGE UP
SODIUM UR-SCNC: 142 MMOL/L — SIGNIFICANT CHANGE UP
SP GR SPEC: 1.01 — SIGNIFICANT CHANGE UP (ref 1–1.03)
TIBC SERPL-MCNC: 167 UG/DL — LOW (ref 220–430)
TRIGL SERPL-MCNC: 74 MG/DL — SIGNIFICANT CHANGE UP
TSH SERPL-MCNC: 3.05 UIU/ML — SIGNIFICANT CHANGE UP (ref 0.27–4.2)
UIBC SERPL-MCNC: 114 UG/DL — SIGNIFICANT CHANGE UP (ref 110–370)
UROBILINOGEN FLD QL: 0.2 E.U./DL — SIGNIFICANT CHANGE UP
UUN UR-MCNC: 126 MG/DL — SIGNIFICANT CHANGE UP
UUN UR-MCNC: 195 MG/DL — SIGNIFICANT CHANGE UP
VIT B12 SERPL-MCNC: 350 PG/ML — SIGNIFICANT CHANGE UP (ref 232–1245)
WBC # BLD: 3.71 K/UL — LOW (ref 3.8–10.5)
WBC # FLD AUTO: 3.71 K/UL — LOW (ref 3.8–10.5)
WBC UR QL: < 5 /HPF — SIGNIFICANT CHANGE UP

## 2023-04-26 PROCEDURE — 76376 3D RENDER W/INTRP POSTPROCES: CPT | Mod: 26

## 2023-04-26 PROCEDURE — 93356 MYOCRD STRAIN IMG SPCKL TRCK: CPT | Mod: 26

## 2023-04-26 PROCEDURE — 99222 1ST HOSP IP/OBS MODERATE 55: CPT

## 2023-04-26 PROCEDURE — 93306 TTE W/DOPPLER COMPLETE: CPT | Mod: 26

## 2023-04-26 RX ORDER — MAGNESIUM SULFATE 500 MG/ML
2 VIAL (ML) INJECTION ONCE
Refills: 0 | Status: COMPLETED | OUTPATIENT
Start: 2023-04-26 | End: 2023-04-26

## 2023-04-26 RX ORDER — HEPARIN SODIUM 5000 [USP'U]/ML
5000 INJECTION INTRAVENOUS; SUBCUTANEOUS EVERY 12 HOURS
Refills: 0 | Status: DISCONTINUED | OUTPATIENT
Start: 2023-04-26 | End: 2023-04-29

## 2023-04-26 RX ORDER — POTASSIUM CHLORIDE 20 MEQ
20 PACKET (EA) ORAL ONCE
Refills: 0 | Status: COMPLETED | OUTPATIENT
Start: 2023-04-26 | End: 2023-04-26

## 2023-04-26 RX ADMIN — HEPARIN SODIUM 5000 UNIT(S): 5000 INJECTION INTRAVENOUS; SUBCUTANEOUS at 22:11

## 2023-04-26 RX ADMIN — HEPARIN SODIUM 5000 UNIT(S): 5000 INJECTION INTRAVENOUS; SUBCUTANEOUS at 10:18

## 2023-04-26 RX ADMIN — LATANOPROST 1 DROP(S): 0.05 SOLUTION/ DROPS OPHTHALMIC; TOPICAL at 22:13

## 2023-04-26 RX ADMIN — DORZOLAMIDE HYDROCHLORIDE TIMOLOL MALEATE 1 DROP(S): 20; 5 SOLUTION/ DROPS OPHTHALMIC at 10:17

## 2023-04-26 RX ADMIN — BRIMONIDINE TARTRATE 1 DROP(S): 2 SOLUTION/ DROPS OPHTHALMIC at 18:06

## 2023-04-26 RX ADMIN — BRIMONIDINE TARTRATE 1 DROP(S): 2 SOLUTION/ DROPS OPHTHALMIC at 07:37

## 2023-04-26 RX ADMIN — Medication 25 GRAM(S): at 10:17

## 2023-04-26 RX ADMIN — AMLODIPINE BESYLATE 10 MILLIGRAM(S): 2.5 TABLET ORAL at 05:36

## 2023-04-26 RX ADMIN — ATORVASTATIN CALCIUM 10 MILLIGRAM(S): 80 TABLET, FILM COATED ORAL at 22:07

## 2023-04-26 RX ADMIN — Medication 20 MILLIGRAM(S): at 22:12

## 2023-04-26 RX ADMIN — Medication 25 MILLIGRAM(S): at 13:11

## 2023-04-26 RX ADMIN — Medication 25 MILLIGRAM(S): at 22:11

## 2023-04-26 RX ADMIN — Medication 20 MILLIGRAM(S): at 10:18

## 2023-04-26 RX ADMIN — DORZOLAMIDE HYDROCHLORIDE TIMOLOL MALEATE 1 DROP(S): 20; 5 SOLUTION/ DROPS OPHTHALMIC at 22:14

## 2023-04-26 RX ADMIN — DORZOLAMIDE HYDROCHLORIDE TIMOLOL MALEATE 1 DROP(S): 20; 5 SOLUTION/ DROPS OPHTHALMIC at 00:10

## 2023-04-26 RX ADMIN — Medication 20 MILLIEQUIVALENT(S): at 10:17

## 2023-04-26 RX ADMIN — NEBIVOLOL HYDROCHLORIDE 10 MILLIGRAM(S): 5 TABLET ORAL at 10:17

## 2023-04-26 RX ADMIN — Medication 25 MILLIGRAM(S): at 05:36

## 2023-04-26 NOTE — PHYSICAL THERAPY INITIAL EVALUATION ADULT - PERTINENT HX OF CURRENT PROBLEM, REHAB EVAL
89F who presented to [facility] ED on 4/25/23 with her daughter, with complaints of worsening shortness of breath over the past week. Patient saw her PMD, Dr. Beyer, 2 weeks ago, was told to start hydralazine 25 mg TID for uncontrolled htn. Patient thought that she was supposed to stop her amlodipine-valsartan  mg when her daughter thinks she was supposed to take both. Since the med switch, patient has been feeling SOB and noticed her BP high, has used 2 pillows to sleep, SOB worse with exertion, and occasional chest pressure.

## 2023-04-26 NOTE — DISCHARGE NOTE PROVIDER - NSDCCPCAREPLAN_GEN_ALL_CORE_FT
PRINCIPAL DISCHARGE DIAGNOSIS  Diagnosis: Congestive heart failure (CHF)  Assessment and Plan of Treatment: You were found to have acute heart failure, which is likely a result of  To manage this you are on the following medications: Lasix _______.  Congestive heart failure can cause make it harder for your heart to pump blood to the rest of your body. As a result, blood can get backed up into your lungs or into your legs. In order to avoid this, make sure to take all of your medications for heart failure as prescribed. This will keep your heart functioning well. If you notice increased difficulty with breathing, cough with bloody mucous, increased swelling in the legs, or chest pain be sure to contact your PCP or call 911 as you may need more treatment. Additionally be sure to follow up with your PCP and Cardiologist on a regular basis to make sure no additional medications or medication changes need to be mades      SECONDARY DISCHARGE DIAGNOSES  Diagnosis: Hypertension  Assessment and Plan of Treatment:     Diagnosis: Congestive heart failure (CHF)  Assessment and Plan of Treatment:      PRINCIPAL DISCHARGE DIAGNOSIS  Diagnosis: Congestive heart failure (CHF)  Assessment and Plan of Treatment: You were found to have acute heart failure, which is likely a result of your aortic stenosis. Congestive heart failure can cause make it harder for your heart to pump blood to the rest of your body. As a result, blood can get backed up into your lungs or into your legs. In order to avoid this, make sure to take all of your medications for heart failure as prescribed. This will keep your heart functioning well. If you notice increased difficulty with breathing, cough with bloody mucous, increased swelling in the legs, or chest pain be sure to contact your PCP or call 911 as you may need more treatment. Additionally be sure to follow up with your PCP and Cardiologist on a regular basis to make sure no additional medications or medication changes need to be mades.  You should take the following medications:  Lasix 20mg for weight gain >2lbs      SECONDARY DISCHARGE DIAGNOSES  Diagnosis: Hypertension  Assessment and Plan of Treatment: Hypertension is the medical term for high blood pressure. Blood pressure refers to the pressure that blood applies to the inner walls of the arteries. Arteries carry blood from the heart to other organs and parts of the body. Untreated high blood pressure increases the strain on the heart and arteries, eventually causing organ damage. High blood pressure increases the risk of heart failure, heart attack (myocardial infarction), stroke, and kidney failure. High blood pressure does not usually cause any symptoms. Treatment of hypertension usually begins with lifestyle changes. Making these lifestyle changes involves little or no risk. Recommended changes often include reducing the amount of salt in your diet, losing weight if you are overweight or obese, avoiding drinking too much alcohol, stopping smoking and exercising at least 30 minutes per day most days of the week. If you are prescribed medication for your hypertension it is important to take these as prescribed to prevent the possible complications of uncontrolled hypertension.   You should take the following medications:      Diagnosis: Congestive heart failure (CHF)  Assessment and Plan of Treatment:

## 2023-04-26 NOTE — DISCHARGE NOTE PROVIDER - CARE PROVIDERS DIRECT ADDRESSES
,DirectAddress_Unknown ,DirectAddress_Unknown,[email or web address]

## 2023-04-26 NOTE — DISCHARGE NOTE PROVIDER - CARE PROVIDER_API CALL
Shane Jones  Family Practice  [address] [address]  Phone: [phone]  Fax: [phone])-  Follow Up Time: 1 week   Shane Jones  Family Practice  [address] [address]  Phone: [phone]  Fax: [phone])-  Follow Up Time: 1 week    Isidro Geller)  Cardiology; Interventional Cardiology  [address] [place] [address]  Phone: [phone]  Fax: [phone]  Scheduled Appointment: 05/08/2023 01:15 PM

## 2023-04-26 NOTE — PROGRESS NOTE ADULT - PROBLEM SELECTOR PLAN 7
F: none   E: K >4, MG >2  N: DASH, TLC, fluid restriction   DVT: heparin   Dispo: pending clinical progression  PT ordered  Patient would like to be FULL CODE

## 2023-04-26 NOTE — PROGRESS NOTE ADULT - SUBJECTIVE AND OBJECTIVE BOX
Internal Medicine Progress Note  Katherine Jackson, PGY-1    ******INCOMPLETE******    OVERNIGHT EVENTS/INTERVAL HPI:    OBJECTIVE:  Vital Signs Last 24 Hrs  T(C): 36.4 (26 Apr 2023 04:55), Max: 36.9 (25 Apr 2023 12:25)  T(F): 97.5 (26 Apr 2023 04:55), Max: 98.5 (25 Apr 2023 12:25)  HR: 56 (26 Apr 2023 05:16) (55 - 72)  BP: 181/79 (26 Apr 2023 05:16) (173/76 - 230/85)  BP(mean): 130 (25 Apr 2023 18:02) (130 - 130)  RR: 17 (26 Apr 2023 05:16) (16 - 87)  SpO2: 100% (26 Apr 2023 05:16) (91% - 100%)    Parameters below as of 26 Apr 2023 05:16  Patient On (Oxygen Delivery Method): nasal cannula  O2 Flow (L/min): 2    I&O's Detail    25 Apr 2023 07:01  -  26 Apr 2023 07:00  --------------------------------------------------------  IN:    Oral Fluid: 120 mL  Total IN: 120 mL    OUT:    Voided (mL): 300 mL  Total OUT: 300 mL    Total NET: -180 mL        Physical Exam:  GENERAL: Awake, alert and interactive, no acute distress, appears comfortable  NEURO: A&Ox4, no focal deficits, 5/5 strength in all ext, reflexes 2+ throughout, CN 2-12 intact  HEENT: Normocephalic, atraumatic, no conjunctivitis or scleral icterus, oral mucosa moist, no oral lesions noted  NECK: Supple, no LAD, no JVD, thyroid not palpable  CARDIAC: Regular rate and rhythm, +S1/S2, no murmurs/rubs/gallops  PULM: Breathing comfortably on RA, clear to auscultation bilaterally, no wheezes/rales/rhonchi  ABDOMEN: Soft, nontender, nondistended, +bs, no hepatosplenomegaly, no rebound tenderness or fluid wave, no CVA tenderness  : Deferred  MSK: Range of motion grossly intact, no back tenderness  SKIN: Warm and dry, no rashes, lesions  VASC: Cap refil < 2 sec, 2+ peripheral pulses, no edema, no LE tenderness  Psych: Appropriate affect    Medications:  MEDICATIONS  (STANDING):  amLODIPine   Tablet 10 milliGRAM(s) Oral daily  atorvastatin 10 milliGRAM(s) Oral at bedtime  brimonidine 0.2% Ophthalmic Solution 1 Drop(s) Both EYES two times a day  dextrose 5%. 1000 milliLiter(s) (100 mL/Hr) IV Continuous <Continuous>  dextrose 5%. 1000 milliLiter(s) (50 mL/Hr) IV Continuous <Continuous>  dextrose 50% Injectable 25 Gram(s) IV Push once  dextrose 50% Injectable 12.5 Gram(s) IV Push once  dextrose 50% Injectable 25 Gram(s) IV Push once  dorzolamide 2%/timolol 0.5% Ophthalmic Solution 1 Drop(s) Both EYES two times a day  furosemide   Injectable 20 milliGRAM(s) IV Push two times a day  glucagon  Injectable 1 milliGRAM(s) IntraMuscular once  heparin   Injectable 5000 Unit(s) SubCutaneous every 12 hours  hydrALAZINE 25 milliGRAM(s) Oral three times a day  insulin lispro (ADMELOG) corrective regimen sliding scale   SubCutaneous Before meals and at bedtime  latanoprost 0.005% Ophthalmic Solution 1 Drop(s) Both EYES at bedtime  nebivolol 10 milliGRAM(s) Oral daily    MEDICATIONS  (PRN):  dextrose Oral Gel 15 Gram(s) Oral once PRN Blood Glucose LESS THAN 70 milliGRAM(s)/deciliter      Labs:                        10.0   4.92  )-----------( 282      ( 25 Apr 2023 13:17 )             31.5     04-25    141  |  108  |  20  ----------------------------<  116<H>  3.9   |  22  |  1.73<H>    Ca    8.6      25 Apr 2023 13:17  Mg     1.7     04-25      PT/INR - ( 25 Apr 2023 13:17 )   PT: 11.5 sec;   INR: 0.97          PTT - ( 25 Apr 2023 13:17 )  PTT:29.7 sec          Radiology: Reviewed OVERNIGHT EVENTS/INTERVAL HPI: No acute events overnight. Pt examined at bedside where she was resting comfortably. Pt states she was confused with her medications started by outpatient physician. Pt thought she was supposed to replace her current BP meds with hydralazine, instead of adding it onto what she was already taking. Currently, pt states she feels improved. Denies HA, chest pain, dyspnea, cough, orthopnea.     OBJECTIVE:  Vital Signs Last 24 Hrs  T(C): 36.4 (26 Apr 2023 04:55), Max: 36.9 (25 Apr 2023 12:25)  T(F): 97.5 (26 Apr 2023 04:55), Max: 98.5 (25 Apr 2023 12:25)  HR: 56 (26 Apr 2023 05:16) (55 - 72)  BP: 181/79 (26 Apr 2023 05:16) (173/76 - 230/85)  BP(mean): 130 (25 Apr 2023 18:02) (130 - 130)  RR: 17 (26 Apr 2023 05:16) (16 - 87)  SpO2: 100% (26 Apr 2023 05:16) (91% - 100%)    Parameters below as of 26 Apr 2023 05:16  Patient On (Oxygen Delivery Method): nasal cannula  O2 Flow (L/min): 2    I&O's Detail    25 Apr 2023 07:01  -  26 Apr 2023 07:00  --------------------------------------------------------  IN:    Oral Fluid: 120 mL  Total IN: 120 mL    OUT:    Voided (mL): 300 mL  Total OUT: 300 mL    Total NET: -180 mL    Physical Exam:  GENERAL: Awake, alert and interactive, no acute distress, appears comfortable, thin  NEURO: A&Ox3, no focal deficits  HEENT: no conjunctivitis or scleral icterus, glaucoma, pupils not tracking during exam (d/t blindness), oral mucosa moist  NECK: Supple, no LAD, no JVD  CARDIAC: Regular rate and rhythm, +S1/S2, loud crescendo/decrescendo systolic murmur most significant R USB. no rubs/gallops  PULM: Breathing comfortably on RA, clear to auscultation bilaterally, no wheezes/rales/rhonchi  ABDOMEN: Soft, nontender, nondistended, +bs, no hepatosplenomegaly, no rebound tenderness or fluid wave, no CVA tenderness  SKIN: Warm and dry, no rashes, lesions  VASC: Cap refill < 2 sec, 1+ peripheral pulses, no edema, no LE tenderness  Psych: Appropriate affect    Medications:  MEDICATIONS  (STANDING):  amLODIPine   Tablet 10 milliGRAM(s) Oral daily  atorvastatin 10 milliGRAM(s) Oral at bedtime  brimonidine 0.2% Ophthalmic Solution 1 Drop(s) Both EYES two times a day  dextrose 5%. 1000 milliLiter(s) (100 mL/Hr) IV Continuous <Continuous>  dextrose 5%. 1000 milliLiter(s) (50 mL/Hr) IV Continuous <Continuous>  dextrose 50% Injectable 25 Gram(s) IV Push once  dextrose 50% Injectable 12.5 Gram(s) IV Push once  dextrose 50% Injectable 25 Gram(s) IV Push once  dorzolamide 2%/timolol 0.5% Ophthalmic Solution 1 Drop(s) Both EYES two times a day  furosemide   Injectable 20 milliGRAM(s) IV Push two times a day  glucagon  Injectable 1 milliGRAM(s) IntraMuscular once  heparin   Injectable 5000 Unit(s) SubCutaneous every 12 hours  hydrALAZINE 25 milliGRAM(s) Oral three times a day  insulin lispro (ADMELOG) corrective regimen sliding scale   SubCutaneous Before meals and at bedtime  latanoprost 0.005% Ophthalmic Solution 1 Drop(s) Both EYES at bedtime  nebivolol 10 milliGRAM(s) Oral daily    MEDICATIONS  (PRN):  dextrose Oral Gel 15 Gram(s) Oral once PRN Blood Glucose LESS THAN 70 milliGRAM(s)/deciliter      Labs:                        10.0   4.92  )-----------( 282      ( 25 Apr 2023 13:17 )             31.5     04-25    141  |  108  |  20  ----------------------------<  116<H>  3.9   |  22  |  1.73<H>    Ca    8.6      25 Apr 2023 13:17  Mg     1.7     04-25      PT/INR - ( 25 Apr 2023 13:17 )   PT: 11.5 sec;   INR: 0.97          PTT - ( 25 Apr 2023 13:17 )  PTT:29.7 sec

## 2023-04-26 NOTE — DISCHARGE NOTE PROVIDER - PROVIDER TOKENS
PROVIDER:[TOKEN:[[id number]],FOLLOWUP:[1 week]] PROVIDER:[TOKEN:[[id number]],FOLLOWUP:[1 week]],PROVIDER:[TOKEN:[[id number]],SCHEDULEDAPPT:[05/08/2023],SCHEDULEDAPPTTIME:[01:15 PM]]

## 2023-04-26 NOTE — DISCHARGE NOTE PROVIDER - NSDCFUSCHEDAPPT_GEN_ALL_CORE_FT
Isidro Geller  [facility] Physician [facility]  [facility] [address]  Scheduled Appointment: 05/08/2023

## 2023-04-26 NOTE — PROGRESS NOTE ADULT - PROBLEM SELECTOR PLAN 6
F/u a1c, patient not on any home meds at this time   - Continue MISS     F: none   E: K >4, MG >2  N: DASH, TLC, fluid restriction   DVT: heparin   Dispo: pending clinical progression  PT ordered  Patient would like to be FULL CODE F/u a1c, patient not on any home meds at this time   - Continue MISS

## 2023-04-26 NOTE — PHYSICAL THERAPY INITIAL EVALUATION ADULT - LEVEL OF INDEPENDENCE: GAIT, REHAB EVAL
min A to navigate around obstacles in hallway 2/2 visually impaired/minimum assist (75% patients effort)

## 2023-04-26 NOTE — PHYSICAL THERAPY INITIAL EVALUATION ADULT - ACTIVE RANGE OF MOTION EXAMINATION, REHAB EVAL
"Requested Prescriptions   Pending Prescriptions Disp Refills     topiramate (TOPAMAX) 25 MG tablet  Last Written Prescription Date:  12/12/19  Last Fill Quantity: 120,  # refills: 0   Last office visit: 12/17/2019 with prescribing provider:  Jaxson   Future Office Visit:   120 tablet 0     Sig: Take 1 tablet (25 mg) by mouth every morning AND 3 tablets (75 mg) every evening.       Anti-Seizure Meds Protocol  Failed - 1/8/2020 11:55 AM        Failed - Review Authorizing provider's last note.      Refer to last progress notes: confirm request is for original authorizing provider (cannot be through other providers).          Failed - Normal ALT or AST on file in past 26 months     Recent Labs   Lab Test 12/19/19  0908   ALT 55*     Recent Labs   Lab Test 12/19/19  0908   AST 32             Passed - Recent (12 mo) or future (30 days) visit within the authorizing provider's specialty     Patient has had an office visit with the authorizing provider or a provider within the authorizing providers department within the previous 12 mos or has a future within next 30 days. See \"Patient Info\" tab in inbasket, or \"Choose Columns\" in Meds & Orders section of the refill encounter.              Passed - Normal CBC on file in past 26 months     Recent Labs   Lab Test 12/19/19  0908   WBC 6.7   RBC 4.34   HGB 14.1   HCT 43.6                    Passed - Normal serum creatinine on file in past 26 months     Recent Labs   Lab Test 12/19/19  0908   CR 0.78             Passed - Normal platelet count on file in past 26 months     Recent Labs   Lab Test 12/19/19  0908                  Passed - Medication is active on med list        Passed - No active pregnancy on record        Passed - No positive pregnancy test in last 12 months        " no Active ROM deficits were identified

## 2023-04-26 NOTE — PROGRESS NOTE ADULT - NSPROGADDITIONALINFOA_GEN_ALL_CORE
Attending Attestation:  I was physically present for the key portions of the evaluation and management (E/M) service provided.  I agree with the above history, physical, and plan which I have reviewed.  John Ortega MD (Cardiology)  See [facility] initial note encounter 4/26

## 2023-04-26 NOTE — PROGRESS NOTE ADULT - PROBLEM SELECTOR PLAN 4
No known hx of CKD, BUN/Cr: 20/1.73  - likely cardiorenal congestion, continue diuresis  - F/u urine lytes  - holding valsartan 320 mg daily   - consider renal consult No known hx of CKD, BUN/Cr: 20/1.73  - likely cardiorenal congestion    Plan:  - c/w diuresis as above  - collateral from outpatient doctor on baseline Cr  - F/u urine lytes  - holding valsartan 320 mg daily

## 2023-04-26 NOTE — PROGRESS NOTE ADULT - PROBLEM SELECTOR PLAN 5
F/u a1c  - home med: lovastatin 10 mg --> atorva 10 (TIC) - home med: lovastatin 10 mg --> atorva 10 (TIC)

## 2023-04-26 NOTE — PROGRESS NOTE ADULT - PROBLEM SELECTOR PLAN 1
Presenting with SOB, +JVD, + bilateral pleural effusions, satting 95% on RA, however, BNP: 04603  - Never has had hx of CHF in past, but daughter states that she has a heart murmur  - Patient w/ htn emergency - likely driving heart failure   - ECHO ordered. f/u results   - CTA PE Protocol 4/25/23:   Cardiomegaly with bilateral pleural effusions, a mosaic attenuation pattern in the lungs, and interlobular septal thickening. These findings are suspicious for congestive heart failure.  Dilated main pulmonary artery, a finding which may be seen with pulmonary hypertension.   - EKG: NSR @ 65 bpm, ST depressions in I.   - DIURESIS: s/p Lasix 40 mg IVP x 1 in ED; continue Lasix 20 mg IV BID (lasix naive)  - GDMT: continue hydralazine 25 mg TID, consider Isordil, cont nebivolol 10 mg (home dose 20 - consider switching to metop/coreg)   - Core measures, Strict I's and O's, fluid restriction Presenting with SOB, +JVD, + bilateral pleural effusions, satting 95% on RA, however, BNP: 40747  - Never has had hx of CHF in past, but has heart murmur (likely AS)  - CTA PE Protocol 4/25/23:   Cardiomegaly with bilateral pleural effusions, a mosaic attenuation pattern in the lungs, and interlobular septal thickening. Dilated main pulmonary artery.  - EKG: NSR @ 65 bpm, ST depressions in I.   - DIURESIS: s/p Lasix 40 mg IVP x 1 in ED    Plan:  - f/u formal TTE  - continue hydralazine 25 mg TID, consider Isordil, cont nebivolol 10 mg (home dose 20 - consider switching to metop/coreg)   - c/w Lasix 20mg IV BID  - Core measures, Strict I's and O's, fluid restriction

## 2023-04-26 NOTE — DISCHARGE NOTE PROVIDER - HOSPITAL COURSE
#Discharge: do not delete    89 y/o F, w/ PMHx of HTN, HLD, DM (however not on meds), who presented to [facility] ED on 4/25/23 with her daughter, with complaints of worsening shortness of breath, orthopnea, chest pressure over the past week i/s/o discontinuing amlodipine-valsartan 10-320mg due to miscommunication with PMD, admitted to cardiology service for hypertensive emergency and acute CHF 2/2 aortic stenosis. Pt was told to start hydralazine 25 mg TID, mistakenly thought she was meant to replace her amlodipine-valsartan instead of adding it.    Problem List/Main Diagnoses:   #Acute CHF (congestive heart failure).   Presenting with SOB, +JVD, + bilateral pleural effusions, satting 95% on RA, however, BNP: 02728  - Never has had hx of CHF in past, but has heart murmur (likely AS)  - CTA PE Protocol 4/25/23: Cardiomegaly with bilateral pleural effusions, a mosaic attenuation pattern in the lungs, and interlobular septal thickening. Dilated main pulmonary artery.  - EKG: NSR @ 65 bpm, ST depressions in I.   - DIURESIS: s/p Lasix 40 mg IVP x 1 in ED  - TTE: normal LV/RV, severely dilated L atrium, moderate AS  Plan:  - continue hydralazine 50mg TID, isordil 10mg TID   - d/c nebivolol d/t bradycardia   - d/c Lasix  - Core measures, Strict I's and O's, fluid restriction.    #Hypertensive emergency.   Presenting in acute heart failure and AUGUSTUS and hypertensive w/ SBP to 200s d/t medication miscommunication. Pt was meant to add hydralazine 25mg TID to existing regimen, instead replaced it and stopped amlodipine-valsartan, leading to HTN emergency with pulm edema/CHF.   - S/P Amlodipine 10 mg PO x 1 in ED, Labetalol 10 mg IVP x 2, Lasix 40 mg IVP x 1   Plan:  - c/w amlodipine 10 mg daily (home med) and Hydralazine 50 mg TID, and isordil 10mg TID  - d/c nebivolol d/t bradycardia  - d/c Lasix  - Goal for SBP 140s-150s.    #Anemia.   Hgb/Hct: 10.0/31.5. Denies hematuria/hematochieza   - F/u iron/b12,folate.    #Elevated serum creatinine.   No known hx of CKD, BUN/Cr: 20/1.73  - likely cardiorenal congestion  - Cr improving  Plan:  - no more diuresis needed at this time  - holding valsartan 320 mg daily.    #Hyperlipidemia.   home med: lovastatin 10 mg --> atorva 10 (TIC).    #Diabetes mellitus.   A1c 5.7%; pre-diabetes. Not on any meds at home.   - Continue MISS.      New medications/therapies:   Labs to be followed outpatient:   Exam to be followed outpatient:     Discharge plan: discharge to home    Physical Exam Upon Discharge:    T(C): 37.2 (04-28-23 @ 08:58), Max: 37.3 (04-28-23 @ 06:09)  HR: 59 (04-28-23 @ 08:53) (58 - 64)  BP: 140/65 (04-28-23 @ 08:53) (140/65 - 190/85)  RR: 16 (04-28-23 @ 08:53) (16 - 18)  SpO2: 96% (04-28-23 @ 07:12) (92% - 98%)    General: NAD, laying in bed, speaking in full sentences  HEENT: head NC/AT, no conjunctival injection, EOMI, MMM  Neck: supple, no JVD  Cardio: RRR, +S1/S2, no M/R/G  Resp: lungs CTAB, no cough/wheezes/rales/rhonchi  Abdo: soft, NT, ND, +bowel sounds x4, no organomegaly or palpable mass    Extremities: WWP, no edema/cyanosis/clubbing   Vasc: 2+ radial and DP pulses b/l  Neuro: A&Ox3  Psych: speech non-pressured, thoughts goal-oriented  Skin: dry, intact, no visible jaundice   MSK: no joint swelling     #Discharge: do not delete    HOSPITAL COURSE: 89 y/o F, w/ PMHx of HTN, HLD, DM (however not on meds), who presented to [facility] ED on 4/25/23 with complaints of worsening shortness of breath, orthopnea, chest pressure over the past week i/s/o discontinuing amlodipine-valsartan 10-320mg due to miscommunication with PMD, admitted to cardiology service for hypertensive emergency and acute CHF 2/2 aortic stenosis. Pt was told to start hydralazine 25 mg TID, mistakenly thought she was meant to replace her amlodipine-valsartan instead of adding it. Pt received Lasix IVP for diuresis with clinical improvement, and improvement in labwork an CXR.     Problem List/Main Diagnoses:   #Acute CHF (congestive heart failure).   Presenting with SOB, +JVD, + bilateral pleural effusions, satting 95% on RA, however, BNP: 96516  - Never has had hx of CHF in past, but has heart murmur (likely AS)  - CTA PE Protocol 4/25/23: Cardiomegaly with bilateral pleural effusions, a mosaic attenuation pattern in the lungs, and interlobular septal thickening. Dilated main pulmonary artery.  - EKG: NSR @ 65 bpm, ST depressions in I.   - DIURESIS: s/p Lasix 40 mg IVP x 1 in ED  - TTE: normal LV/RV, severely dilated L atrium, moderate AS  Plan:  - continue hydralazine 50mg TID, isordil 10mg TID   - d/c nebivolol d/t bradycardia   - d/c Lasix  - Core measures, Strict I's and O's, fluid restriction.    #Hypertensive emergency.   Presenting in acute heart failure and AUGUSTUS and hypertensive w/ SBP to 200s d/t medication miscommunication. Pt was meant to add hydralazine 25mg TID to existing regimen, instead replaced it and stopped amlodipine-valsartan, leading to HTN emergency with pulm edema/CHF.   - S/P Amlodipine 10 mg PO x 1 in ED, Labetalol 10 mg IVP x 2, Lasix 40 mg IVP x 1   Plan:  - c/w amlodipine 10 mg daily (home med) and Hydralazine 50 mg TID, and isordil 10mg TID  - d/c nebivolol d/t bradycardia  - d/c Lasix  - Goal for SBP 140s-150s.    #Anemia.   Hgb/Hct: 10.0/31.5. Denies hematuria/hematochieza   - F/u iron/b12,folate.    #Elevated serum creatinine.   No known hx of CKD, BUN/Cr: 20/1.73  - likely cardiorenal congestion  - Cr improving  Plan:  - no more diuresis needed at this time  - holding valsartan 320 mg daily.    #Hyperlipidemia.   home med: lovastatin 10 mg --> atorva 10 (TIC).    #Diabetes mellitus.   A1c 5.7%; pre-diabetes. Not on any meds at home.   - Continue MISS.      New medications/therapies:   Labs to be followed outpatient:   Exam to be followed outpatient:     Discharge plan: discharge to home    Physical Exam Upon Discharge:    T(C): 37.2 (04-28-23 @ 08:58), Max: 37.3 (04-28-23 @ 06:09)  HR: 59 (04-28-23 @ 08:53) (58 - 64)  BP: 140/65 (04-28-23 @ 08:53) (140/65 - 190/85)  RR: 16 (04-28-23 @ 08:53) (16 - 18)  SpO2: 96% (04-28-23 @ 07:12) (92% - 98%)    General: NAD, laying in bed, speaking in full sentences  HEENT: head NC/AT, no conjunctival injection, EOMI, MMM  Neck: supple, no JVD  Cardio: RRR, +S1/S2, no M/R/G  Resp: lungs CTAB, no cough/wheezes/rales/rhonchi  Abdo: soft, NT, ND, +bowel sounds x4, no organomegaly or palpable mass    Extremities: WWP, no edema/cyanosis/clubbing   Vasc: 2+ radial and DP pulses b/l  Neuro: A&Ox3  Psych: speech non-pressured, thoughts goal-oriented  Skin: dry, intact, no visible jaundice   MSK: no joint swelling     #Discharge: do not delete    HOSPITAL COURSE: 89 y/o F, w/ PMHx of HTN, HLD, DM (however not on meds), who presented to [facility] ED on 4/25/23 with complaints of worsening shortness of breath, orthopnea, chest pressure over the past week i/s/o discontinuing amlodipine-valsartan 10-320mg due to miscommunication with PMD, admitted to cardiology service for hypertensive emergency and acute CHF 2/2 aortic stenosis. Pt was told to start hydralazine 25 mg TID, mistakenly thought she was meant to replace her amlodipine-valsartan instead of adding it. Pt received Lasix IVP for diuresis with clinical improvement, and improvement in labwork an CXR.     Problem List/Main Diagnoses:   #Acute CHF (congestive heart failure).   Presenting with SOB, +JVD, + bilateral pleural effusions, satting 95% on RA, however, BNP: 68325  - Never has had hx of CHF in past, but has heart murmur (likely AS)  - CTA PE Protocol 4/25/23: Cardiomegaly with bilateral pleural effusions, a mosaic attenuation pattern in the lungs, and interlobular septal thickening. Dilated main pulmonary artery.  - EKG: NSR @ 65 bpm, ST depressions in I.   - DIURESIS: s/p Lasix 40 mg IVP x 1 in ED  - TTE: normal LV/RV, severely dilated L atrium, moderate AS  Plan:  - c/w home hydralazine 25mg TID  - d/c nebivolol d/t bradycardia   - start Lasix 20mg po PRN for weight gain >2lbs  - Core measures, Strict I's and O's, fluid restriction.  **Dry weight on discharge: ______**    #Hypertensive emergency.   Presenting in acute heart failure and AUGUSTUS and hypertensive w/ SBP to 200s d/t medication miscommunication. Pt was meant to add hydralazine 25mg TID to existing regimen, instead replaced it and stopped amlodipine-valsartan, leading to HTN emergency with pulm edema/CHF.   - S/P Amlodipine 10 mg PO x 1 in ED, Labetalol 10 mg IVP x 2, Lasix 40 mg IVP x 1   Plan:  - c/w amlodipine 10 mg daily (home med) and Hydralazine 50 mg TID, and isordil 10mg TID  - d/c nebivolol d/t bradycardia  - d/c Lasix  - Goal for SBP 140s-150s.    #Anemia.   Hgb/Hct: 10.0/31.5. Denies hematuria/hematochieza   - F/u iron/b12,folate.    #Elevated serum creatinine.   No known hx of CKD, BUN/Cr: 20/1.73  - likely cardiorenal congestion  - Cr improving  Plan:  - no more diuresis needed at this time  - holding valsartan 320 mg daily.    #Hyperlipidemia.   home med: lovastatin 10 mg --> atorva 10 (TIC).    #Diabetes mellitus.   A1c 5.7%; pre-diabetes. Not on any meds at home.   - Continue MISS.      New medications/therapies:   Labs to be followed outpatient:   Exam to be followed outpatient:     Discharge plan: discharge to home    Physical Exam Upon Discharge:    T(C): 37.2 (04-28-23 @ 08:58), Max: 37.3 (04-28-23 @ 06:09)  HR: 59 (04-28-23 @ 08:53) (58 - 64)  BP: 140/65 (04-28-23 @ 08:53) (140/65 - 190/85)  RR: 16 (04-28-23 @ 08:53) (16 - 18)  SpO2: 96% (04-28-23 @ 07:12) (92% - 98%)    General: NAD, laying in bed, speaking in full sentences  HEENT: head NC/AT, no conjunctival injection, EOMI, MMM  Neck: supple, no JVD  Cardio: RRR, +S1/S2, no M/R/G  Resp: lungs CTAB, no cough/wheezes/rales/rhonchi  Abdo: soft, NT, ND, +bowel sounds x4, no organomegaly or palpable mass    Extremities: WWP, no edema/cyanosis/clubbing   Vasc: 2+ radial and DP pulses b/l  Neuro: A&Ox3  Psych: speech non-pressured, thoughts goal-oriented  Skin: dry, intact, no visible jaundice   MSK: no joint swelling     #Discharge: do not delete    HOSPITAL COURSE: 89 y/o F, w/ PMHx of HTN, HLD, DM (however not on meds), who presented to [facility] ED on 4/25/23 with complaints of worsening shortness of breath, orthopnea, chest pressure over the past week i/s/o discontinuing amlodipine-valsartan 10-320mg due to miscommunication with PMD, admitted to cardiology service for hypertensive emergency and acute CHF 2/2 aortic stenosis. Pt was told to start hydralazine 25 mg TID, mistakenly thought she was meant to replace her amlodipine-valsartan instead of adding it. Pt received Lasix IVP for diuresis with clinical improvement, and improvement in labwork an CXR.     Problem List/Main Diagnoses:   #Acute CHF (congestive heart failure).   Presenting with SOB, +JVD, + bilateral pleural effusions, satting 95% on RA, however, BNP: 57252  - Never has had hx of CHF in past, but has heart murmur (likely AS)  - CTA PE Protocol 4/25/23: Cardiomegaly with bilateral pleural effusions, a mosaic attenuation pattern in the lungs, and interlobular septal thickening. Dilated main pulmonary artery.  - EKG: NSR @ 65 bpm, ST depressions in I.   - DIURESIS: s/p Lasix 40 mg IVP x 1 in ED  - TTE: normal LV/RV, severely dilated L atrium, moderate AS  Plan:  - c/w home hydralazine 25mg TID  - d/c nebivolol d/t bradycardia   - start Lasix 20mg po PRN for weight gain >2lbs  - Core measures, Strict I's and O's, fluid restriction.  **Dry weight on discharge: ______**    #Hypertensive emergency.   Presenting in acute heart failure and AUGUSTUS and hypertensive w/ SBP to 200s d/t medication miscommunication. Pt was meant to add hydralazine 25mg TID to existing regimen, instead replaced it and stopped amlodipine-valsartan, leading to HTN emergency with pulm edema/CHF.   - S/P Amlodipine 10 mg PO x 1 in ED, Labetalol 10 mg IVP x 2, Lasix 40 mg IVP x 1   Plan:  - c/w amlodipine 10 mg daily (home med) and Hydralazine 50 mg TID, and isordil 10mg TID  - d/c nebivolol d/t bradycardia  - Goal for SBP 140s-150s.    #Anemia.   Hgb/Hct: 10.0/31.5. Denies hematuria/hematochieza   TIBC dec. Total Fe/% sat WNL.   Folate, B12 WNL.    #Elevated serum creatinine.   No known hx of CKD, BUN/Cr: 20/1.73  - likely component cardiorenal congestion vs diuresis   - Cr peaked now lateral at 2.28; FeUrea indicative of pre-renal etiology  Plan:  - no more diuresis needed at this time  - holding valsartan 320 mg daily.    #Hyperlipidemia.   home med: lovastatin 10 mg --> atorva 10 (TIC).  Plan:  - c/w home atorvastatin 10mg qhs    #Diabetes mellitus.   A1c 5.7%; pre-diabetes. Not on any meds at home.     New medications/therapies: Lasix 20mg po PRN for weight gain >2lbs  Labs to be followed outpatient: BMP, BNP  Exam to be followed outpatient: Primary Care, CT Surgery    Discharge plan: discharge to home    Physical Exam Upon Discharge:    T(C): 37 (04-29-23 @ 08:59), Max: 37.1 (04-28-23 @ 18:36)  HR: 64 (04-29-23 @ 11:55) (56 - 64)  BP: 179/82 (04-29-23 @ 11:55) (133/61 - 181/79)  RR: 18 (04-29-23 @ 11:55) (16 - 18)  SpO2: 92% (04-29-23 @ 11:55) (91% - 100%)    Physical Exam:  GENERAL: Awake, alert and interactive, no acute distress, appears comfortable, thin  NEURO: A&Ox3, no focal deficits  HEENT: no conjunctivitis or scleral icterus, glaucoma, pupils not tracking during exam (d/t blindness), oral mucosa moist  NECK: Supple, no LAD, no JVD  CARDIAC: Regular rate and rhythm, +S1/S2, loud crescendo/decrescendo systolic murmur most significant R USB. no rubs/gallops  PULM: Breathing comfortably on RA, clear to auscultation bilaterally, no wheezes/rales/rhonchi  ABDOMEN: Soft, nontender, nondistended, +bs, no hepatosplenomegaly, no rebound tenderness or fluid wave, no CVA tenderness  SKIN: Warm and dry, no rashes, lesions  VASC: Cap refill < 2 sec, 1+ peripheral pulses, no edema, no LE tenderness  Psych: Appropriate affect       #Discharge: do not delete    HOSPITAL COURSE: 89 y/o F, w/ PMHx of HTN, HLD, DM (however not on meds), who presented to [facility] ED on 4/25/23 with complaints of worsening shortness of breath, orthopnea, chest pressure over the past week i/s/o discontinuing amlodipine-valsartan 10-320mg due to miscommunication with PMD, admitted to cardiology service for hypertensive emergency and acute CHF 2/2 aortic stenosis. Pt was told to start hydralazine 25 mg TID, mistakenly thought she was meant to replace her amlodipine-valsartan instead of adding it. Pt received Lasix IVP for diuresis with clinical improvement, and improvement in labwork an CXR.     Problem List/Main Diagnoses:   #Acute CHF (congestive heart failure).   Presenting with SOB, +JVD, + bilateral pleural effusions, satting 95% on RA, however, BNP: 14674  - Never has had hx of CHF in past, but has heart murmur (likely AS)  - CTA PE Protocol 4/25/23: Cardiomegaly with bilateral pleural effusions, a mosaic attenuation pattern in the lungs, and interlobular septal thickening. Dilated main pulmonary artery.  - EKG: NSR @ 65 bpm, ST depressions in I.   - DIURESIS: s/p Lasix 40 mg IVP x 1 in ED, Lasix 20mg IVP  - TTE: normal LV/RV, severely dilated L atrium, moderate AS  Plan:  - c/w home hydralazine 25mg TID  - d/c nebivolol d/t bradycardia   - start Lasix 20mg po PRN for weight gain >2lbs  - Core measures, Strict I's and O's, fluid restriction.  **Dry weight on discharge: 105.8lbs**    #Hypertensive emergency.   Presenting in acute heart failure and AUGUSTUS and hypertensive w/ SBP to 200s d/t medication miscommunication. Pt was meant to add hydralazine 25mg TID to existing regimen, instead replaced it and stopped amlodipine-valsartan, leading to HTN emergency with pulm edema/CHF. BPs now controlled in 130s-150s, to resume home medications and d/c beta blocker.   Plan:  - c/w amlodipine-valsartan 10-320mg qd  - c/w home hydralazine 25mg TID  - d/c nebivolol d/t bradycardia  - Goal for SBP 140s-150s.  - f/u outpatient with PCP Dr. Jones    #Anemia.   Hgb/Hct: 10.0/31.5. Denies hematuria/hematochieza   TIBC dec. Total Fe/% sat WNL.   Folate, B12 WNL.    #Elevated serum creatinine.   No known hx of CKD, BUN/Cr: 20/1.73  - likely component cardiorenal congestion vs diuresis vs KRISTINE  - Cr peaked now lateral at 2.28; FeUrea indicative of pre-renal etiology  Plan:  - no more diuresis needed at this time    #Hyperlipidemia.   home med: lovastatin 10 mg --> atorva 10 (TIC).  Plan:  - c/w home lovastatin 10mg qhs    #Diabetes mellitus.   A1c 5.7%; pre-diabetes. Not on any meds at home.     New medications/therapies: Lasix 20mg po PRN for weight gain >2lbs  Labs to be followed outpatient: BMP, BNP  Exam to be followed outpatient: Primary Care, CT Surgery    Discharge plan: discharge to home    Physical Exam Upon Discharge:    T(C): 37 (04-29-23 @ 08:59), Max: 37.1 (04-28-23 @ 18:36)  HR: 64 (04-29-23 @ 11:55) (56 - 64)  BP: 179/82 (04-29-23 @ 11:55) (133/61 - 181/79)  RR: 18 (04-29-23 @ 11:55) (16 - 18)  SpO2: 92% (04-29-23 @ 11:55) (91% - 100%)    Physical Exam:  GENERAL: Awake, alert and interactive, no acute distress, appears comfortable, thin  NEURO: A&Ox3, no focal deficits  HEENT: no conjunctivitis or scleral icterus, glaucoma, pupils not tracking during exam (d/t blindness), oral mucosa moist  NECK: Supple, no LAD, no JVD  CARDIAC: Regular rate and rhythm, +S1/S2, loud crescendo/decrescendo systolic murmur most significant R USB. no rubs/gallops  PULM: Breathing comfortably on RA, clear to auscultation bilaterally, no wheezes/rales/rhonchi  ABDOMEN: Soft, nontender, nondistended, +bs, no hepatosplenomegaly, no rebound tenderness or fluid wave, no CVA tenderness  SKIN: Warm and dry, no rashes, lesions  VASC: Cap refill < 2 sec, 1+ peripheral pulses, no edema, no LE tenderness  Psych: Appropriate affect       #Discharge: do not delete    HOSPITAL COURSE: 89 y/o F, w/ PMHx of HTN, HLD, DM (however not on meds), who presented to [facility] ED on 4/25/23 with complaints of worsening shortness of breath, orthopnea, chest pressure over the past week i/s/o discontinuing amlodipine-valsartan 10-320mg due to miscommunication with PMD, admitted to cardiology service for hypertensive emergency and acute CHF 2/2 aortic stenosis. Pt was told to start hydralazine 25 mg TID, mistakenly thought she was meant to replace her amlodipine-valsartan instead of adding it. Pt received Lasix IVP for diuresis with clinical improvement, and improvement in labwork an CXR.     Problem List/Main Diagnoses:   #Acute CHF (congestive heart failure).   Presenting with SOB, +JVD, + bilateral pleural effusions, satting 95% on RA, however, BNP: 00726  - Never has had hx of CHF in past, but has heart murmur (likely AS)  - CTA PE Protocol 4/25/23: Cardiomegaly with bilateral pleural effusions, a mosaic attenuation pattern in the lungs, and interlobular septal thickening. Dilated main pulmonary artery.  - EKG: NSR @ 65 bpm, ST depressions in I.   - DIURESIS: s/p Lasix 40 mg IVP x 1 in ED, Lasix 20mg IVP  - TTE: normal LV/RV, severely dilated L atrium, moderate AS  Plan:  - c/w home hydralazine 25mg TID  - d/c nebivolol d/t bradycardia   - start Lasix 20mg po PRN for weight gain >2lbs  - Core measures, Strict I's and O's, fluid restriction.  **Dry weight on discharge: 105.8lbs**    #Hypertensive emergency.   Presenting in acute heart failure and AUGUSTUS and hypertensive w/ SBP to 200s d/t medication miscommunication. Pt was meant to add hydralazine 25mg TID to existing regimen, instead replaced it and stopped amlodipine-valsartan, leading to HTN emergency with pulm edema/CHF. BPs now controlled in 130s-150s, to resume home medications and d/c beta blocker.   Plan:  - c/w amlodipine-valsartan 10-320mg qd  - c/w home hydralazine 25mg TID  - d/c nebivolol d/t bradycardia  - Goal for SBP 140s-150s.  - f/u outpatient with PCP Dr. Jones    #Anemia.   Hgb/Hct: 10.0/31.5. Denies hematuria/hematochieza   TIBC dec. Total Fe/% sat WNL.   Folate, B12 WNL.    #Elevated serum creatinine.   No known hx of CKD, BUN/Cr: 20/1.73  - likely component cardiorenal congestion vs diuresis vs KRISTINE  - Cr peaked now lateral at 2.28; FeUrea indicative of pre-renal etiology  Plan:  - no more diuresis needed at this time    #Hyperlipidemia.   home med: lovastatin 10 mg --> atorva 10 (TIC).  Plan:  - c/w home lovastatin 10mg qhs    #Diabetes mellitus.   A1c 5.7%; pre-diabetes. Not on any meds at home.     New medications/therapies: Lasix 20mg po PRN for weight gain >2lbs  Labs to be followed outpatient: BMP, BNP  Exam to be followed outpatient: Primary Care, CT Surgery    Discharge plan: discharge to home    Physical Exam Upon Discharge:    T(C): 37 (04-29-23 @ 08:59), Max: 37.1 (04-28-23 @ 18:36)  HR: 64 (04-29-23 @ 11:55) (56 - 64)  BP: 179/82 (04-29-23 @ 11:55) (133/61 - 181/79)  RR: 18 (04-29-23 @ 11:55) (16 - 18)  SpO2: 92% (04-29-23 @ 11:55) (91% - 100%)    Physical Exam:  GENERAL: Awake, alert and interactive, no acute distress, appears comfortable, thin  NEURO: A&Ox3, no focal deficits  HEENT: no conjunctivitis or scleral icterus, glaucoma, pupils not tracking during exam (d/t blindness), oral mucosa moist  NECK: Supple, no LAD, no JVD  CARDIAC: Regular rate and rhythm, +S1/S2, loud crescendo/decrescendo systolic murmur most significant R USB. no rubs/gallops  PULM: Breathing comfortably on RA, clear to auscultation bilaterally, no wheezes/rales/rhonchi  ABDOMEN: Soft, nontender, nondistended, +bs, no hepatosplenomegaly, no rebound tenderness or fluid wave, no CVA tenderness  SKIN: Warm and dry, no rashes, lesions  VASC: Cap refill < 2 sec, 1+ peripheral pulses, no edema, no LE tenderness  Psych: Appropriate affect  I was physically present for the key portions of the evaluation and management (E/M) service provided.  I have personally seen and examined this patient.  I have reviewed vitals, labs, medications, cardiac studies and remaining additional imaging.  I agree with the above discharge documentation which I have reviewed and edited where appropriate. Patient is hemodynamically stable and appropriate for discharge home on the above prescribed medications.  Close outpatient cardiology follow up is recommended within 1-2 weeks.    Janna Cerna MD   Cardiology Attending    35 minutes spent on total encounter; more than 50% of the visit was spent counseling and/or coordinating care by the attending physician, with plan of care discussed with the patient and cardiac team.

## 2023-04-26 NOTE — PROGRESS NOTE ADULT - PROBLEM SELECTOR PLAN 2
Presenting in acute heart failure and AUGUSTUS and hypertensive w/ SBP to 200s  - S/P Amlodipine 10 mg PO x 1 in ED, Labetalol 10 mg IVP x 2, Lasix 40 mg IVP x 1   - Will give Hydralazine 10 mg IVP x 1  -  continue diuresis with lasix 20 mg IVP BID (lasix naive)   - Continue nebivolol 10 mg daily (home med), amlodipine 10 mg daily (home med) and  Hydralazine 25 mg TID   - Goal for SBP 160s-170s Presenting in acute heart failure and AUGUSTUS and hypertensive w/ SBP to 200s d/t medication miscommunication. Pt was meant to add hydralazine 25mg TID to existing regimen, instead replaced it and stopped amlodipine-valsartan, leading to HTN emergency with pulm edema/CHF.   - S/P Amlodipine 10 mg PO x 1 in ED, Labetalol 10 mg IVP x 2, Lasix 40 mg IVP x 1   Plan:  - continue diuresis with lasix 20 mg IVP BID (lasix naive)   - Continue nebivolol 10 mg daily (home med), amlodipine 10 mg daily (home med) and Hydralazine 25 mg TID   - Goal for SBP 160s-170s

## 2023-04-26 NOTE — PROGRESS NOTE ADULT - ASSESSMENT
89 y/o F, w/ PMHx of HTN, HLD, DM (however not on meds), who presented to [facility] ED on 4/25/23 with her daughter, with complaints of worsening shortness of breath over the past week. Patient saw her PMD, Dr. Beyer, 2 weeks ago, was told to start hydralazine 25 mg TID for uncontrolled htn. Patient thought that she was supposed to stop her amlodipine-valsartan  mg when her daughter thinks she was supposed to take both. Since the med switch, patient has been feeling SOB and noticed her BP high, has used 2 pillows to sleep, SOB worse with exertion, and occasional chest pressure. In the ED, patient overloaded on exam, CT chest with b/l pleural effusions, BNP elevated to [address]. Patient 's BP elevated to 216/87 mmHg. Patient admitted to cardiology/telemetry for hypertensive emergency and acute heart failure.    89 y/o F, w/ PMHx of HTN, HLD, DM (however not on meds), who presented to [facility] ED on 4/25/23 with her daughter, with complaints of worsening shortness of breath, orthopnea, chest pressure over the past week i/s/o discontinuing amlodipine-valsartan 10-320mg due to miscommunication with PMD, admitted to cardiology service for hypertensive emergency and acute CHF 2/2 aortic stenosis. Pt was told to start hydralazine 25 mg TID, mistakenly thought she was meant to replace her amlodipine-valsartan instead of adding it.

## 2023-04-26 NOTE — DISCHARGE NOTE PROVIDER - NSDCMRMEDTOKEN_GEN_ALL_CORE_FT
Alphagan 0.2% ophthalmic solution: 1 drop(s) in each affected eye 2 times a day  Cosopt 2%-0.5% ophthalmic solution: 1 drop(s) in each affected eye 2 times a day  Exforge 10 mg-160 mg oral tablet: 1 tab(s) orally once a day  hydrALAZINE 25 mg oral tablet: 1 tab(s) orally 3 times a day  lovastatin 20 mg oral tablet: 1 tab(s) orally once a day  Lumigan 0.01% ophthalmic solution: 1 drop(s) in each affected eye once a day (at bedtime)  Nebivolol 20 mg oral tablet: 1 tab(s) orally once a day   Alphagan 0.2% ophthalmic solution: 1 drop(s) in each affected eye 2 times a day  amlodipine-valsartan 10 mg-320 mg oral tablet: 1 tab(s) orally once a day  Cosopt 2%-0.5% ophthalmic solution: 1 drop(s) in each affected eye 2 times a day  hydrALAZINE 25 mg oral tablet: 1 tab(s) orally 3 times a day  Lasix 20 mg oral tablet: 1 tab(s) orally once a day as needed for weight gain due to heart failure Take 1 tablet for weight gain &gt;2lbs  lovastatin 20 mg oral tablet: 1 tab(s) orally once a day  Lumigan 0.01% ophthalmic solution: 1 drop(s) in each affected eye once a day (at bedtime)   Alphagan 0.2% ophthalmic solution: 1 drop(s) in each affected eye 2 times a day  amlodipine-valsartan 10 mg-320 mg oral tablet: 1 tab(s) orally once a day  amlodipine-valsartan 10 mg-320 mg oral tablet: 1 tab(s) orally once a day  Cosopt 2%-0.5% ophthalmic solution: 1 drop(s) in each affected eye 2 times a day  hydrALAZINE 25 mg oral tablet: 1 tab(s) orally 3 times a day  Lasix 20 mg oral tablet: 1 tab(s) orally once a day as needed for weight gain due to heart failure Take 1 tablet for weight gain &gt;2lbs  lovastatin 20 mg oral tablet: 1 tab(s) orally once a day  Lumigan 0.01% ophthalmic solution: 1 drop(s) in each affected eye once a day (at bedtime)

## 2023-04-26 NOTE — DISCHARGE NOTE PROVIDER - NSDCFUADDAPPT_GEN_ALL_CORE_FT
Please follow-up with your outpatient physician, Dr. Shane Jones.    *We have contacted Dr. Shane Jones's office, and they will be calling you on Monday to schedule an appointment. We would like for you to be seen within 1 week.  Please follow-up with your outpatient physician, Dr. Shane Jones.    *We have contacted Dr. Shane Jones's office, and they will be calling you on Monday to schedule an appointment. We would like for you to be seen within 1 week.     *You also have an appointment with Dr. Geller from Interventional Cardiology. Your appointment is scheduled for: 5/8/2023 @ 1:15PM

## 2023-04-27 LAB
ALBUMIN SERPL ELPH-MCNC: 3 G/DL — LOW (ref 3.3–5)
ALP SERPL-CCNC: 64 U/L — SIGNIFICANT CHANGE UP (ref 40–120)
ALT FLD-CCNC: <5 U/L — LOW (ref 10–45)
ANION GAP SERPL CALC-SCNC: 11 MMOL/L — SIGNIFICANT CHANGE UP (ref 5–17)
AST SERPL-CCNC: 16 U/L — SIGNIFICANT CHANGE UP (ref 10–40)
BASOPHILS # BLD AUTO: 0.04 K/UL — SIGNIFICANT CHANGE UP (ref 0–0.2)
BASOPHILS NFR BLD AUTO: 1 % — SIGNIFICANT CHANGE UP (ref 0–2)
BILIRUB SERPL-MCNC: 0.3 MG/DL — SIGNIFICANT CHANGE UP (ref 0.2–1.2)
BUN SERPL-MCNC: 20 MG/DL — SIGNIFICANT CHANGE UP (ref 7–23)
CALCIUM SERPL-MCNC: 8.7 MG/DL — SIGNIFICANT CHANGE UP (ref 8.4–10.5)
CHLORIDE SERPL-SCNC: 105 MMOL/L — SIGNIFICANT CHANGE UP (ref 96–108)
CO2 SERPL-SCNC: 24 MMOL/L — SIGNIFICANT CHANGE UP (ref 22–31)
CREAT SERPL-MCNC: 2.05 MG/DL — HIGH (ref 0.5–1.3)
EGFR: 22 ML/MIN/1.73M2 — LOW
EOSINOPHIL # BLD AUTO: 0.23 K/UL — SIGNIFICANT CHANGE UP (ref 0–0.5)
EOSINOPHIL NFR BLD AUTO: 5.9 % — SIGNIFICANT CHANGE UP (ref 0–6)
GLUCOSE BLDC GLUCOMTR-MCNC: 109 MG/DL — HIGH (ref 70–99)
GLUCOSE BLDC GLUCOMTR-MCNC: 122 MG/DL — HIGH (ref 70–99)
GLUCOSE BLDC GLUCOMTR-MCNC: 141 MG/DL — HIGH (ref 70–99)
GLUCOSE BLDC GLUCOMTR-MCNC: 146 MG/DL — HIGH (ref 70–99)
GLUCOSE SERPL-MCNC: 108 MG/DL — HIGH (ref 70–99)
HCT VFR BLD CALC: 30.8 % — LOW (ref 34.5–45)
HGB BLD-MCNC: 9.5 G/DL — LOW (ref 11.5–15.5)
IMM GRANULOCYTES NFR BLD AUTO: 0 % — SIGNIFICANT CHANGE UP (ref 0–0.9)
LYMPHOCYTES # BLD AUTO: 0.91 K/UL — LOW (ref 1–3.3)
LYMPHOCYTES # BLD AUTO: 23.4 % — SIGNIFICANT CHANGE UP (ref 13–44)
MAGNESIUM SERPL-MCNC: 2 MG/DL — SIGNIFICANT CHANGE UP (ref 1.6–2.6)
MCHC RBC-ENTMCNC: 30.8 GM/DL — LOW (ref 32–36)
MCHC RBC-ENTMCNC: 31.4 PG — SIGNIFICANT CHANGE UP (ref 27–34)
MCV RBC AUTO: 101.7 FL — HIGH (ref 80–100)
MONOCYTES # BLD AUTO: 0.49 K/UL — SIGNIFICANT CHANGE UP (ref 0–0.9)
MONOCYTES NFR BLD AUTO: 12.6 % — SIGNIFICANT CHANGE UP (ref 2–14)
NEUTROPHILS # BLD AUTO: 2.22 K/UL — SIGNIFICANT CHANGE UP (ref 1.8–7.4)
NEUTROPHILS NFR BLD AUTO: 57.1 % — SIGNIFICANT CHANGE UP (ref 43–77)
NRBC # BLD: 0 /100 WBCS — SIGNIFICANT CHANGE UP (ref 0–0)
PHOSPHATE SERPL-MCNC: 4.2 MG/DL — SIGNIFICANT CHANGE UP (ref 2.5–4.5)
PLATELET # BLD AUTO: 280 K/UL — SIGNIFICANT CHANGE UP (ref 150–400)
POTASSIUM SERPL-MCNC: 3.4 MMOL/L — LOW (ref 3.5–5.3)
POTASSIUM SERPL-SCNC: 3.4 MMOL/L — LOW (ref 3.5–5.3)
PROT SERPL-MCNC: 7.2 G/DL — SIGNIFICANT CHANGE UP (ref 6–8.3)
RBC # BLD: 3.03 M/UL — LOW (ref 3.8–5.2)
RBC # FLD: 14.6 % — HIGH (ref 10.3–14.5)
SODIUM SERPL-SCNC: 140 MMOL/L — SIGNIFICANT CHANGE UP (ref 135–145)
WBC # BLD: 3.89 K/UL — SIGNIFICANT CHANGE UP (ref 3.8–10.5)
WBC # FLD AUTO: 3.89 K/UL — SIGNIFICANT CHANGE UP (ref 3.8–10.5)

## 2023-04-27 PROCEDURE — 99232 SBSQ HOSP IP/OBS MODERATE 35: CPT | Mod: 25

## 2023-04-27 RX ORDER — POTASSIUM CHLORIDE 20 MEQ
20 PACKET (EA) ORAL ONCE
Refills: 0 | Status: COMPLETED | OUTPATIENT
Start: 2023-04-27 | End: 2023-04-27

## 2023-04-27 RX ORDER — ISOSORBIDE DINITRATE 5 MG/1
10 TABLET ORAL ONCE
Refills: 0 | Status: COMPLETED | OUTPATIENT
Start: 2023-04-27 | End: 2023-04-27

## 2023-04-27 RX ORDER — POLYETHYLENE GLYCOL 3350 17 G/17G
17 POWDER, FOR SOLUTION ORAL DAILY
Refills: 0 | Status: DISCONTINUED | OUTPATIENT
Start: 2023-04-27 | End: 2023-04-29

## 2023-04-27 RX ORDER — ISOSORBIDE DINITRATE 5 MG/1
10 TABLET ORAL THREE TIMES A DAY
Refills: 0 | Status: DISCONTINUED | OUTPATIENT
Start: 2023-04-27 | End: 2023-04-29

## 2023-04-27 RX ORDER — POTASSIUM CHLORIDE 20 MEQ
40 PACKET (EA) ORAL ONCE
Refills: 0 | Status: COMPLETED | OUTPATIENT
Start: 2023-04-27 | End: 2023-04-27

## 2023-04-27 RX ORDER — SENNA PLUS 8.6 MG/1
2 TABLET ORAL AT BEDTIME
Refills: 0 | Status: DISCONTINUED | OUTPATIENT
Start: 2023-04-27 | End: 2023-04-29

## 2023-04-27 RX ORDER — HYDRALAZINE HCL 50 MG
25 TABLET ORAL ONCE
Refills: 0 | Status: COMPLETED | OUTPATIENT
Start: 2023-04-27 | End: 2023-04-27

## 2023-04-27 RX ORDER — HYDRALAZINE HCL 50 MG
50 TABLET ORAL EVERY 8 HOURS
Refills: 0 | Status: DISCONTINUED | OUTPATIENT
Start: 2023-04-27 | End: 2023-04-28

## 2023-04-27 RX ADMIN — HEPARIN SODIUM 5000 UNIT(S): 5000 INJECTION INTRAVENOUS; SUBCUTANEOUS at 10:28

## 2023-04-27 RX ADMIN — ATORVASTATIN CALCIUM 10 MILLIGRAM(S): 80 TABLET, FILM COATED ORAL at 23:53

## 2023-04-27 RX ADMIN — DORZOLAMIDE HYDROCHLORIDE TIMOLOL MALEATE 1 DROP(S): 20; 5 SOLUTION/ DROPS OPHTHALMIC at 23:59

## 2023-04-27 RX ADMIN — BRIMONIDINE TARTRATE 1 DROP(S): 2 SOLUTION/ DROPS OPHTHALMIC at 19:07

## 2023-04-27 RX ADMIN — BRIMONIDINE TARTRATE 1 DROP(S): 2 SOLUTION/ DROPS OPHTHALMIC at 05:49

## 2023-04-27 RX ADMIN — NEBIVOLOL HYDROCHLORIDE 10 MILLIGRAM(S): 5 TABLET ORAL at 05:45

## 2023-04-27 RX ADMIN — AMLODIPINE BESYLATE 10 MILLIGRAM(S): 2.5 TABLET ORAL at 05:45

## 2023-04-27 RX ADMIN — Medication 25 MILLIGRAM(S): at 05:45

## 2023-04-27 RX ADMIN — Medication 50 MILLIGRAM(S): at 15:52

## 2023-04-27 RX ADMIN — Medication 40 MILLIEQUIVALENT(S): at 10:30

## 2023-04-27 RX ADMIN — ISOSORBIDE DINITRATE 10 MILLIGRAM(S): 5 TABLET ORAL at 10:28

## 2023-04-27 RX ADMIN — Medication 20 MILLIEQUIVALENT(S): at 13:41

## 2023-04-27 RX ADMIN — ISOSORBIDE DINITRATE 10 MILLIGRAM(S): 5 TABLET ORAL at 23:53

## 2023-04-27 RX ADMIN — DORZOLAMIDE HYDROCHLORIDE TIMOLOL MALEATE 1 DROP(S): 20; 5 SOLUTION/ DROPS OPHTHALMIC at 10:29

## 2023-04-27 RX ADMIN — Medication 50 MILLIGRAM(S): at 23:53

## 2023-04-27 RX ADMIN — Medication 25 MILLIGRAM(S): at 10:28

## 2023-04-27 RX ADMIN — LATANOPROST 1 DROP(S): 0.05 SOLUTION/ DROPS OPHTHALMIC; TOPICAL at 23:59

## 2023-04-27 RX ADMIN — POLYETHYLENE GLYCOL 3350 17 GRAM(S): 17 POWDER, FOR SOLUTION ORAL at 13:36

## 2023-04-27 RX ADMIN — ISOSORBIDE DINITRATE 10 MILLIGRAM(S): 5 TABLET ORAL at 15:52

## 2023-04-27 RX ADMIN — HEPARIN SODIUM 5000 UNIT(S): 5000 INJECTION INTRAVENOUS; SUBCUTANEOUS at 23:53

## 2023-04-27 NOTE — PROGRESS NOTE ADULT - PROBLEM SELECTOR PLAN 1
Presenting with SOB, +JVD, + bilateral pleural effusions, satting 95% on RA, however, BNP: 44523  - Never has had hx of CHF in past, but has heart murmur (likely AS)  - CTA PE Protocol 4/25/23:   Cardiomegaly with bilateral pleural effusions, a mosaic attenuation pattern in the lungs, and interlobular septal thickening. Dilated main pulmonary artery.  - EKG: NSR @ 65 bpm, ST depressions in I.   - DIURESIS: s/p Lasix 40 mg IVP x 1 in ED    Plan:  - f/u formal TTE  - continue hydralazine 25 mg TID, consider Isordil, cont nebivolol 10 mg (home dose 20 - consider switching to metop/coreg)   - c/w Lasix 20mg IV BID  - Core measures, Strict I's and O's, fluid restriction Presenting with SOB, +JVD, + bilateral pleural effusions, satting 95% on RA, however, BNP: 98439  - Never has had hx of CHF in past, but has heart murmur (likely AS)  - CTA PE Protocol 4/25/23: Cardiomegaly with bilateral pleural effusions, a mosaic attenuation pattern in the lungs, and interlobular septal thickening. Dilated main pulmonary artery.  - EKG: NSR @ 65 bpm, ST depressions in I.   - DIURESIS: s/p Lasix 40 mg IVP x 1 in ED  - TTE: normal LV/RV, severely dilated L atrium, moderate AS    Plan:  - continue hydralazine 50mg TID, isordil 10mg TID   - d/c nebivolol d/t bradycardia   - d/c Lasix  - Core measures, Strict I's and O's, fluid restriction

## 2023-04-27 NOTE — PROGRESS NOTE ADULT - PROBLEM SELECTOR PLAN 2
Presenting in acute heart failure and AUGUSTUS and hypertensive w/ SBP to 200s d/t medication miscommunication. Pt was meant to add hydralazine 25mg TID to existing regimen, instead replaced it and stopped amlodipine-valsartan, leading to HTN emergency with pulm edema/CHF.   - S/P Amlodipine 10 mg PO x 1 in ED, Labetalol 10 mg IVP x 2, Lasix 40 mg IVP x 1   Plan:  - continue diuresis with lasix 20 mg IVP BID (lasix naive)   - Continue nebivolol 10 mg daily (home med), amlodipine 10 mg daily (home med) and Hydralazine 25 mg TID   - Goal for SBP 160s-170s Presenting in acute heart failure and AUGUSTUS and hypertensive w/ SBP to 200s d/t medication miscommunication. Pt was meant to add hydralazine 25mg TID to existing regimen, instead replaced it and stopped amlodipine-valsartan, leading to HTN emergency with pulm edema/CHF.   - S/P Amlodipine 10 mg PO x 1 in ED, Labetalol 10 mg IVP x 2, Lasix 40 mg IVP x 1   Plan:  - c/w amlodipine 10 mg daily (home med) and Hydralazine 50 mg TID, and isordil 10mg TID  - d/c nebivolol d/t bradycardia  - d/c Lasix  - Goal for SBP 140s-150s

## 2023-04-27 NOTE — PROGRESS NOTE ADULT - PROBLEM SELECTOR PLAN 7
F: none   E: K >4, MG >2  N: DASH, TLC, fluid restriction   DVT: heparin   Dispo: pending clinical progression  PT ordered  Patient would like to be FULL CODE F: none   E: K >4, MG >2  N: DASH, TLC, fluid restriction   DVT: heparin 5000IU subq q8h  Dispo: pending clinical progression  PT ordered  Patient would like to be FULL CODE

## 2023-04-27 NOTE — CONSULT NOTE ADULT - ASSESSMENT
Assessment:  89 y/o F, w/ PMHx of HTN, HLD, DM (however not on meds), who presented to [facility] ED on 4/25/23 with her daughter, with complaints of worsening shortness of breath over the past week. Patient saw her PMD, Dr. Beyer, 2 weeks ago, was told to start hydralazine 25 mg TID for uncontrolled htn. Patient thought that she was supposed to stop her amlodipine-valsartan  mg when her daughter thinks she was supposed to take both. Since the med switch, patient has been feeling SOB and noticed her BP high, has used 2 pillows to sleep, SOB worse with exertion, and occasional chest pressure. In the ED, patient overloaded on exam, CT chest with b/l pleural effusions, [place] [address]. Patient 's BP elevated to 216/87 mmHg. Patient admitted to cardiology/telemetry for hypertensive emergency and acute heart failure. TTE completed with moderate AS. Structural heart team consulted for further evaluation for AS.    Plan:  Problem 1: moderate AS  - Discussed with Dr. Harrell and Dr. Soares  - TTE 4/26/23: severely dilated left atrium.  There is moderate aortic stenosis. The peak transvalvular velocity is 3.39 m/s, the mean transvalvular gradient is 24.00 mmHg, and the LVOT/AV velocity ratio is 0.49. The peak transaortic gradient is 45.97 mmHg. The aortic valve area (estimated via the continuity method) is 1.25 cm². There is no evidence of aortic regurgitation.  - Received IV lasix for fluid overload, now appears euvolemic  - Continue titrating BP meds as able  - Given elevated creatinine on admission, patient will be followed up as an outpatient and     Problem 2:      Problem 3:      Problem 4:    I have reviewed clinical labs tests and reports, radiology tests and reports, as well as old patient medical records, and discussed with the referring physician.     Assessment:  89 y/o F, w/ PMHx of HTN, HLD, DM (however not on meds), who presented to [facility] ED on 4/25/23 with her daughter, with complaints of worsening shortness of breath over the past week. Patient saw her PMD, Dr. Beyer, 2 weeks ago, was told to start hydralazine 25 mg TID for uncontrolled htn. Patient thought that she was supposed to stop her amlodipine-valsartan  mg when her daughter thinks she was supposed to take both. Since the med switch, patient has been feeling SOB and noticed her BP high, has used 2 pillows to sleep, SOB worse with exertion, and occasional chest pressure. In the ED, patient overloaded on exam, CT chest with b/l pleural effusions, [place] to [address]. Patient 's BP elevated to 216/87 mmHg. Patient admitted to cardiology/telemetry for hypertensive emergency and acute heart failure. TTE completed with moderate AS. Structural heart team consulted for further evaluation for AS.    Plan:  Problem 1: moderate AS  - Discussed with Dr. Harrell and Dr. Soares  - TTE 4/26/23: severely dilated left atrium.  There is moderate aortic stenosis. The peak transvalvular velocity is 3.39 m/s, the mean transvalvular gradient is 24.00 mmHg, and the LVOT/AV velocity ratio is 0.49. The peak transaortic gradient is 45.97 mmHg. The aortic valve area (estimated via the continuity method) is 1.25 cm². There is no evidence of aortic regurgitation.  - Received IV lasix for fluid overload, now appears euvolemic  - Continue titrating BP meds as able  - Given elevated creatinine on admission, patient will be followed up as an outpatient to undergo further workup for potential intervention. Including TAVR scans and LHC/RHC to evaluate coronaries.   - She will follow up with either Dr. Soares or Dr. Geller as an outpatient.  - Structural heart will continue to follow.    Problem 2: uncontrolled HTN  - Continue antihypertensives  - Care per primary team    Problem 3: HLD  - Care per primary team    Problem 4: DM  - Care per primary team    I have reviewed clinical labs tests and reports, radiology tests and reports, as well as old patient medical records, and discussed with the referring physician.     Assessment:  89 y/o F, w/ PMHx of HTN, HLD, DM (however not on meds), who presented to [facility] ED on 4/25/23 with her daughter, with complaints of worsening shortness of breath over the past week. Patient saw her PMD, Dr. Beyer, 2 weeks ago, was told to start hydralazine 25 mg TID for uncontrolled htn. Patient thought that she was supposed to stop her amlodipine-valsartan  mg when her daughter thinks she was supposed to take both. Since the med switch, patient has been feeling SOB and noticed her BP high, has used 2 pillows to sleep, SOB worse with exertion, and occasional chest pressure. In the ED, patient overloaded on exam, CT chest with b/l pleural effusions, BNP elevated to 08756. Patient 's BP elevated to 216/87 mmHg. Patient admitted to cardiology/telemetry for hypertensive emergency and acute heart failure. TTE completed with moderate AS. Structural heart team consulted for further evaluation for AS.    Plan:  Problem 1: moderate AS  - Discussed with Dr. Harrell and Dr. Soares  - TTE 4/26/23: severely dilated left atrium.  There is moderate aortic stenosis. The peak transvalvular velocity is 3.39 m/s, the mean transvalvular gradient is 24.00 mmHg, and the LVOT/AV velocity ratio is 0.49. The peak transaortic gradient is 45.97 mmHg. The aortic valve area (estimated via the continuity method) is 1.25 cm². There is no evidence of aortic regurgitation.  - Received IV lasix for fluid overload, now appears euvolemic  - Continue titrating BP meds as able  - Given elevated creatinine on admission, patient will be followed up as an outpatient to undergo further workup for potential intervention. Including TAVR scans and LHC/RHC to evaluate coronaries.   - She will follow up with Dr. Geller as an outpatient. Please have her call [phone] to schedule an appointment if she does not hear from them within 2 days.  - Structural heart will sign off at this time. Please reconsult with any further questions or concerns.    Problem 2: uncontrolled HTN  - Continue antihypertensives  - Care per primary team    Problem 3: HLD  - Care per primary team    Problem 4: DM  - Care per primary team    I have reviewed clinical labs tests and reports, radiology tests and reports, as well as old patient medical records, and discussed with the referring physician.

## 2023-04-27 NOTE — PROGRESS NOTE ADULT - PROBLEM SELECTOR PLAN 6
F/u a1c, patient not on any home meds at this time   - Continue MISS A1c 5.7%; pre-diabetes. Not on any meds at home.     - Continue MISS

## 2023-04-27 NOTE — PROGRESS NOTE ADULT - PROBLEM SELECTOR PLAN 4
No known hx of CKD, BUN/Cr: 20/1.73  - likely cardiorenal congestion    Plan:  - c/w diuresis as above  - collateral from outpatient doctor on baseline Cr  - F/u urine lytes  - holding valsartan 320 mg daily No known hx of CKD, BUN/Cr: 20/1.73  - likely cardiorenal congestion  - Cr improving    Plan:  - no more diuresis needed at this time  - holding valsartan 320 mg daily

## 2023-04-27 NOTE — CONSULT NOTE ADULT - SUBJECTIVE AND OBJECTIVE BOX
Surgeon: Dr. Soares    Requesting Physician: Dr. Ortega    HISTORY OF PRESENT ILLNESS:  89 y/o F, w/ PMHx of HTN, HLD, DM (however not on meds), who presented to [facility] ED on 23 with her daughter, with complaints of worsening shortness of breath over the past week. Patient saw her PMD, Dr. Beyer, 2 weeks ago, was told to start hydralazine 25 mg TID for uncontrolled htn. Patient thought that she was supposed to stop her amlodipine-valsartan  mg when her daughter thinks she was supposed to take both. Since the med switch, patient has been feeling SOB and noticed her BP high, has used 2 pillows to sleep, SOB worse with exertion, and occasional chest pressure. In the ED, patient overloaded on exam, CT chest with b/l pleural effusions, [place] [address]. Patient 's BP elevated to 216/87 mmHg. Patient admitted to cardiology/telemetry for hypertensive emergency and acute heart failure. TTE completed with moderate AS. Structural heart team consulted for further evaluation for AS.    PAST MEDICAL & SURGICAL HISTORY:  DM (diabetes mellitus)      HTN (hypertension)      Heart murmur      H/O total hysterectomy          MEDICATIONS  (STANDING):  amLODIPine   Tablet 10 milliGRAM(s) Oral daily  atorvastatin 10 milliGRAM(s) Oral at bedtime  brimonidine 0.2% Ophthalmic Solution 1 Drop(s) Both EYES two times a day  dextrose 5%. 1000 milliLiter(s) (100 mL/Hr) IV Continuous <Continuous>  dextrose 5%. 1000 milliLiter(s) (50 mL/Hr) IV Continuous <Continuous>  dextrose 50% Injectable 25 Gram(s) IV Push once  dextrose 50% Injectable 25 Gram(s) IV Push once  dextrose 50% Injectable 12.5 Gram(s) IV Push once  dorzolamide 2%/timolol 0.5% Ophthalmic Solution 1 Drop(s) Both EYES two times a day  glucagon  Injectable 1 milliGRAM(s) IntraMuscular once  heparin   Injectable 5000 Unit(s) SubCutaneous every 12 hours  hydrALAZINE 50 milliGRAM(s) Oral every 8 hours  insulin lispro (ADMELOG) corrective regimen sliding scale   SubCutaneous Before meals and at bedtime  isosorbide   dinitrate Tablet (ISORDIL) 10 milliGRAM(s) Oral three times a day  latanoprost 0.005% Ophthalmic Solution 1 Drop(s) Both EYES at bedtime  nebivolol 10 milliGRAM(s) Oral daily  polyethylene glycol 3350 17 Gram(s) Oral daily  potassium chloride   Solution 20 milliEquivalent(s) Oral once  senna 2 Tablet(s) Oral at bedtime    MEDICATIONS  (PRN):  dextrose Oral Gel 15 Gram(s) Oral once PRN Blood Glucose LESS THAN 70 milliGRAM(s)/deciliter      Allergies    No Known Allergies    Intolerances        SOCIAL HISTORY:  Smoker:  never smoker  ETOH use:  social drinker 1-2x a month      FAMILY HISTORY:      Review of Systems:  CONSTITUTIONAL: Denies fevers / chills, sweats, fatigue, weight loss, weight gain                                       NEURO:  Denies paraesthesias seizures, syncope, confusion                                                                                  EYES:  Denies discharge                                                                                 ENMT:  Denies difficulty hearing, vertigo, dysphagia, epistaxis, recent dental work                                       CV:  Denies chest pain, palpitations, DINH, orthopnea                                                                                           RESPIRATORY:  Denies wheezing, SOB, cough / sputum, hemoptysis                                                               GI:  Denies nausea, vomiting, diarrhea, constipation, melena                                                                          : Denies hematuria, dysuria, urgency, incontinence                                                                                          MUSKULOSKELETAL:  Denies arthritis, joint swelling, muscle weakness                                                             SKIN/BREAST:  Denies rash, itching, hair loss, masses                                                                                              PSYCH:  Denies depression, anxiety, suicidal ideation                                                                                                HEME/LYMPH:  Denies bruises easily, enlarged lymph nodes, tender lymph nodes                                          ENDOCRINE:  Denies cold intolerance, heat intolerance, polydipsia                                                                      Vital Signs Last 24 Hrs  T(C): 36.2 (2023 10:35), Max: 36.9 (2023 22:28)  T(F): 97.2 (2023 10:35), Max: 98.4 (2023 22:28)  HR: 64 (2023 10:27) (54 - 64)  BP: 165/72 (2023 10:27) (156/71 - 197/88)  BP(mean): 103 (2023 10:27) (97 - 126)  RR: 17 (2023 10:27) (16 - 18)  SpO2: 95% (2023 10:27) (95% - 99%)    Parameters below as of 2023 10:27  Patient On (Oxygen Delivery Method): room air        Physical Exam  General: NAD, sitting comfortably in bed, conversing appropriately  Head: NC/AT  Neurological: alert and oriented, UE and LE strength equal b/l, facial symmetry present  Cardiovascular: RRR, Clear S1 and S2, +systolic murmur  Respiratory: chest expansion symmetrical, CTA b/l, no wheezing noted  Gastrointestinal: soft, NT, ND  Extremities: moving spontaneously, no calf tenderness or edema.  Vascular: warm, well perfused.   Skin: no obvious rashes  Incisions: none                                                          LABS:                        9.5    3.89  )-----------( 280      ( 2023 05:30 )             30.8     -    140  |  105  |  20  ----------------------------<  108<H>  3.4<L>   |  24  |  2.05<H>    Ca    8.7      2023 05:30  Phos  4.2       Mg     2.0         TPro  7.2  /  Alb  3.0<L>  /  TBili  0.3  /  DBili  x   /  AST  16  /  ALT  <5<L>  /  AlkPhos  64  -27    PT/INR - ( 2023 13:17 )   PT: 11.5 sec;   INR: 0.97          PTT - ( 2023 13:17 )  PTT:29.7 sec  Urinalysis Basic - ( 2023 07:43 )    Color: Yellow / Appearance: Clear / S.010 / pH: x  Gluc: x / Ketone: NEGATIVE  / Bili: Negative / Urobili: 0.2 E.U./dL   Blood: x / Protein: Trace mg/dL / Nitrite: NEGATIVE   Leuk Esterase: NEGATIVE / RBC: < 5 /HPF / WBC < 5 /HPF   Sq Epi: x / Non Sq Epi: x / Bacteria: Present /HPF      CARDIAC MARKERS ( 2023 13:17 )  x     / 0.01 ng/mL / x     / x     / x          RADIOLOGY & ADDITIONAL STUDIES:    CXR:    < from: Xray Chest 1 View-PORTABLE IMMEDIATE (Xray Chest 1 View-PORTABLE IMMEDIATE .) (23 @ 14:08) >  IMPRESSION: New bilateral infiltrates or congestion. Small left pleural   effusion.    < end of copied text >    CT:    < from: CT Angio Chest PE Protocol w/ IV Cont (23 @ 17:00) >  IMPRESSION:  1. No evidence of acute pulmonary embolism.    2. Cardiomegaly with bilateral pleural effusions, a mosaic attenuation   pattern in the lungs, and interlobular septal thickening. These findings   are suspicious for congestive heart failure.    3. Dilated main pulmonary artery, a finding which may beseen with   pulmonary hypertension.    4. A 1.8 cm cystic lesion in the pancreatic head is partially visualized.   It could represent a cystic neoplasm, such as a branch duct type   intraductal papillary mucinous neoplasm. If indicated, an MRI of the  pancreas could be used for further evaluation.    < end of copied text >      TTE / PLACIDO:  < from: TTE Echo Complete w/o Contrast w/ Doppler (23 @ 15:45) >  CONCLUSIONS:     1. Normal left and right ventricular sizeand systolic function.   2. Mild symmetric left ventricular hypertrophy.   3. Grade II left ventricular diastolic dysfunction.   4. Severely dilated left atrium.   5. There is moderate aortic stenosis. The peak transvalvular velocity is   3.39 m/s, the mean transvalvular gradient is 24.00 mmHg, and the LVOT/AV   velocity ratio is 0.49. The peak transaortic gradient is 45.97 mmHg. The   aortic valve area (estimated via the continuity method) is 1.25 cm².   There is no evidence of aortic regurgitation.   6. Pulmonary artery systolic pressure is 39 mmHg.   7. No pericardial effusion.    < end of copied text >

## 2023-04-27 NOTE — CONSULT NOTE ADULT - NSCONSULTADDITIONALINFOA_GEN_ALL_CORE
Attending Attestation:  I was physically present for the key portions of the evaluation and management (E/M) service provided.  I agree with the above history, physical, and plan which I have reviewed with the following edits/addendum:    89F w HTN HLP DMT2 p/w worsening DINH and elevated BPs (216/87). Initial pertinent labs crea 1.7 (baseline ~1 - 2017), BNP 13K, hgb 10. CT PE negative, + R pleural effusion. ECG sinus, non ischemic. Exam w significant long crescendo holosystolic murmur heard best in aortic position.    - TTE to evaluate cardiac structure particularly valves-- moderate AS, preserved LVEF, diastolic dysfxn. Structural team saw and will arrange outpatient follow up for potential intervention.   - DC diuresis. Monitor renal fxn.   - HTN mgt: hydral 25-->50 tid, start isordil 10 (ok to continue amlod, nebivolol)  - fu PRA-iliana level in AM, now that she has been off RAAS meds  - AUGUSTUS: check renal US. Suspect with some baseline renal dysfxn worsened acutely by ADHFpEF, uncontrolled HTN. Obtain records from PMD re recent crea prior to admission     John Ortega MD  Cardiology    35 minutes spent on total encounter; more than 50% of the visit was spent counseling and/or coordinating care by the attending physician. Error entry

## 2023-04-27 NOTE — PROGRESS NOTE ADULT - SUBJECTIVE AND OBJECTIVE BOX
Internal Medicine Progress Note  Katherine Jackson, PGY-1    ******INCOMPLETE******    OVERNIGHT EVENTS/INTERVAL HPI:    OBJECTIVE:  Vital Signs Last 24 Hrs  T(C): 36.9 (2023 17:22), Max: 36.9 (2023 22:28)  T(F): 98.5 (2023 17:22), Max: 98.5 (2023 17:22)  HR: 62 (2023 13:37) (54 - 64)  BP: 158/74 (2023 13:37) (156/71 - 197/88)  BP(mean): 106 (2023 13:37) (97 - 126)  RR: 17 (2023 13:37) (16 - 18)  SpO2: 96% (2023 13:37) (95% - 99%)    Parameters below as of 2023 13:37  Patient On (Oxygen Delivery Method): room air      I&O's Detail    2023 07:01  -  2023 07:00  --------------------------------------------------------  IN:    IV PiggyBack: 50 mL    Oral Fluid: 360 mL  Total IN: 410 mL    OUT:    Voided (mL): 700 mL  Total OUT: 700 mL    Total NET: -290 mL      2023 07:01  -  2023 18:07  --------------------------------------------------------  IN:    Oral Fluid: 360 mL  Total IN: 360 mL    OUT:  Total OUT: 0 mL    Total NET: 360 mL        Physical Exam:  GENERAL: Awake, alert and interactive, no acute distress, appears comfortable  NEURO: A&Ox4, no focal deficits, 5/5 strength in all ext, reflexes 2+ throughout, CN 2-12 intact  HEENT: Normocephalic, atraumatic, no conjunctivitis or scleral icterus, oral mucosa moist, no oral lesions noted  NECK: Supple, no LAD, no JVD, thyroid not palpable  CARDIAC: Regular rate and rhythm, +S1/S2, no murmurs/rubs/gallops  PULM: Breathing comfortably on RA, clear to auscultation bilaterally, no wheezes/rales/rhonchi  ABDOMEN: Soft, nontender, nondistended, +bs, no hepatosplenomegaly, no rebound tenderness or fluid wave, no CVA tenderness  : Deferred  MSK: Range of motion grossly intact, no back tenderness  SKIN: Warm and dry, no rashes, lesions  VASC: Cap refil < 2 sec, 2+ peripheral pulses, no edema, no LE tenderness  Psych: Appropriate affect    Medications:  MEDICATIONS  (STANDING):  amLODIPine   Tablet 10 milliGRAM(s) Oral daily  atorvastatin 10 milliGRAM(s) Oral at bedtime  brimonidine 0.2% Ophthalmic Solution 1 Drop(s) Both EYES two times a day  dextrose 5%. 1000 milliLiter(s) (100 mL/Hr) IV Continuous <Continuous>  dextrose 5%. 1000 milliLiter(s) (50 mL/Hr) IV Continuous <Continuous>  dextrose 50% Injectable 25 Gram(s) IV Push once  dextrose 50% Injectable 12.5 Gram(s) IV Push once  dextrose 50% Injectable 25 Gram(s) IV Push once  dorzolamide 2%/timolol 0.5% Ophthalmic Solution 1 Drop(s) Both EYES two times a day  glucagon  Injectable 1 milliGRAM(s) IntraMuscular once  heparin   Injectable 5000 Unit(s) SubCutaneous every 12 hours  hydrALAZINE 50 milliGRAM(s) Oral every 8 hours  insulin lispro (ADMELOG) corrective regimen sliding scale   SubCutaneous Before meals and at bedtime  isosorbide   dinitrate Tablet (ISORDIL) 10 milliGRAM(s) Oral three times a day  latanoprost 0.005% Ophthalmic Solution 1 Drop(s) Both EYES at bedtime  polyethylene glycol 3350 17 Gram(s) Oral daily  senna 2 Tablet(s) Oral at bedtime    MEDICATIONS  (PRN):  dextrose Oral Gel 15 Gram(s) Oral once PRN Blood Glucose LESS THAN 70 milliGRAM(s)/deciliter      Labs:                        9.5    3.89  )-----------( 280      ( 2023 05:30 )             30.8         140  |  105  |  20  ----------------------------<  108<H>  3.4<L>   |  24  |  2.05<H>    Ca    8.7      2023 05:30  Phos  4.2       Mg     2.0         TPro  7.2  /  Alb  3.0<L>  /  TBili  0.3  /  DBili  x   /  AST  16  /  ALT  <5<L>  /  AlkPhos  64          Urinalysis Basic - ( 2023 07:43 )    Color: Yellow / Appearance: Clear / S.010 / pH: x  Gluc: x / Ketone: NEGATIVE  / Bili: Negative / Urobili: 0.2 E.U./dL   Blood: x / Protein: Trace mg/dL / Nitrite: NEGATIVE   Leuk Esterase: NEGATIVE / RBC: < 5 /HPF / WBC < 5 /HPF   Sq Epi: x / Non Sq Epi: x / Bacteria: Present /HPF          Radiology: Reviewed OVERNIGHT EVENTS/INTERVAL HPI: No acute events overnight. Pt examined at bedside, states that she feels better. She still feels "a little" short of breath but it is significantly improved from admission. Denies HA, dizziness, chest pain ,palpitations.     OBJECTIVE:  Vital Signs Last 24 Hrs  T(C): 36.9 (2023 17:22), Max: 36.9 (2023 22:28)  T(F): 98.5 (2023 17:22), Max: 98.5 (2023 17:22)  HR: 62 (2023 13:37) (54 - 64)  BP: 158/74 (2023 13:37) (156/71 - 197/88)  BP(mean): 106 (2023 13:37) (97 - 126)  RR: 17 (2023 13:37) (16 - 18)  SpO2: 96% (2023 13:37) (95% - 99%)    Parameters below as of 2023 13:37  Patient On (Oxygen Delivery Method): room air      I&O's Detail    2023 07:01  -  2023 07:00  --------------------------------------------------------  IN:    IV PiggyBack: 50 mL    Oral Fluid: 360 mL  Total IN: 410 mL    OUT:    Voided (mL): 700 mL  Total OUT: 700 mL    Total NET: -290 mL      2023 07:01  -  2023 18:07  --------------------------------------------------------  IN:    Oral Fluid: 360 mL  Total IN: 360 mL    OUT:  Total OUT: 0 mL    Total NET: 360 mL    Physical Exam:  GENERAL: Awake, alert and interactive, no acute distress, appears comfortable, thin  NEURO: A&Ox3, no focal deficits  HEENT: no conjunctivitis or scleral icterus, glaucoma, pupils not tracking during exam (d/t blindness), oral mucosa moist  NECK: Supple, no LAD, no JVD  CARDIAC: Regular rate and rhythm, +S1/S2, loud crescendo/decrescendo systolic murmur most significant R USB. no rubs/gallops  PULM: Breathing comfortably on RA, clear to auscultation bilaterally, no wheezes/rales/rhonchi  ABDOMEN: Soft, nontender, nondistended, +bs, no hepatosplenomegaly, no rebound tenderness or fluid wave, no CVA tenderness  SKIN: Warm and dry, no rashes, lesions  VASC: Cap refill < 2 sec, 1+ peripheral pulses, no edema, no LE tenderness  Psych: Appropriate affect      Medications:  MEDICATIONS  (STANDING):  amLODIPine   Tablet 10 milliGRAM(s) Oral daily  atorvastatin 10 milliGRAM(s) Oral at bedtime  brimonidine 0.2% Ophthalmic Solution 1 Drop(s) Both EYES two times a day  dextrose 5%. 1000 milliLiter(s) (100 mL/Hr) IV Continuous <Continuous>  dextrose 5%. 1000 milliLiter(s) (50 mL/Hr) IV Continuous <Continuous>  dextrose 50% Injectable 25 Gram(s) IV Push once  dextrose 50% Injectable 12.5 Gram(s) IV Push once  dextrose 50% Injectable 25 Gram(s) IV Push once  dorzolamide 2%/timolol 0.5% Ophthalmic Solution 1 Drop(s) Both EYES two times a day  glucagon  Injectable 1 milliGRAM(s) IntraMuscular once  heparin   Injectable 5000 Unit(s) SubCutaneous every 12 hours  hydrALAZINE 50 milliGRAM(s) Oral every 8 hours  insulin lispro (ADMELOG) corrective regimen sliding scale   SubCutaneous Before meals and at bedtime  isosorbide   dinitrate Tablet (ISORDIL) 10 milliGRAM(s) Oral three times a day  latanoprost 0.005% Ophthalmic Solution 1 Drop(s) Both EYES at bedtime  polyethylene glycol 3350 17 Gram(s) Oral daily  senna 2 Tablet(s) Oral at bedtime    MEDICATIONS  (PRN):  dextrose Oral Gel 15 Gram(s) Oral once PRN Blood Glucose LESS THAN 70 milliGRAM(s)/deciliter      Labs:                        9.5    3.89  )-----------( 280      ( 2023 05:30 )             30.8     04    140  |  105  |  20  ----------------------------<  108<H>  3.4<L>   |  24  |  2.05<H>    Ca    8.7      2023 05:30  Phos  4.2       Mg     2.0         TPro  7.2  /  Alb  3.0<L>  /  TBili  0.3  /  DBili  x   /  AST  16  /  ALT  <5<L>  /  AlkPhos  64          Urinalysis Basic - ( 2023 07:43 )    Color: Yellow / Appearance: Clear / S.010 / pH: x  Gluc: x / Ketone: NEGATIVE  / Bili: Negative / Urobili: 0.2 E.U./dL   Blood: x / Protein: Trace mg/dL / Nitrite: NEGATIVE   Leuk Esterase: NEGATIVE / RBC: < 5 /HPF / WBC < 5 /HPF   Sq Epi: x / Non Sq Epi: x / Bacteria: Present /HPF

## 2023-04-27 NOTE — PROGRESS NOTE ADULT - NSPROGADDITIONALINFOA_GEN_ALL_CORE
Attending Attestation:  I was physically present for the key portions of the evaluation and management (E/M) service provided.  I agree with the above history, physical, and plan which I have reviewed with the following edits/addendum:    89F w HTN HLP DMT2 p/w worsening DINH and elevated BPs (216/87). Initial pertinent labs crea 1.7 (baseline ~1 - 2017), BNP 13K, hgb 10. CT PE negative, + R pleural effusion. ECG sinus, non ischemic. Exam w significant long crescendo holosystolic murmur heard best in aortic position.  - TTE 4/27- moderate AS, preserved LVEF, diastolic dysfxn. Structural team saw and will arrange outpatient follow up for potential intervention.   - DC diuresis. Monitor renal fxn.   - HTN mgt: hydral 25-->50 tid, start isordil 10 (ok to continue amlod, nebivolol)  - fu PRA-iliana level in AM, now that she has been off RAAS meds  - AUGUSTUS: check renal US. Suspect with some baseline renal dysfxn worsened acutely by ADHFpEF, uncontrolled HTN. Obtain records from PMD re recent crea prior to admission     John Ortega MD  Cardiology    35 minutes spent on total encounter; more than 50% of the visit was spent counseling and/or coordinating care by the attending physician.

## 2023-04-28 DIAGNOSIS — N17.9 ACUTE KIDNEY FAILURE, UNSPECIFIED: ICD-10-CM

## 2023-04-28 LAB
ALDOST SERPL-MCNC: 25.3 NG/DL — HIGH
ANION GAP SERPL CALC-SCNC: 7 MMOL/L — SIGNIFICANT CHANGE UP (ref 5–17)
ANION GAP SERPL CALC-SCNC: 9 MMOL/L — SIGNIFICANT CHANGE UP (ref 5–17)
APPEARANCE UR: CLEAR — SIGNIFICANT CHANGE UP
BACTERIA # UR AUTO: PRESENT /HPF
BASOPHILS # BLD AUTO: 0.04 K/UL — SIGNIFICANT CHANGE UP (ref 0–0.2)
BASOPHILS NFR BLD AUTO: 0.9 % — SIGNIFICANT CHANGE UP (ref 0–2)
BILIRUB UR-MCNC: ABNORMAL
BUN SERPL-MCNC: 23 MG/DL — SIGNIFICANT CHANGE UP (ref 7–23)
BUN SERPL-MCNC: 23 MG/DL — SIGNIFICANT CHANGE UP (ref 7–23)
CALCIUM SERPL-MCNC: 8.7 MG/DL — SIGNIFICANT CHANGE UP (ref 8.4–10.5)
CALCIUM SERPL-MCNC: 8.7 MG/DL — SIGNIFICANT CHANGE UP (ref 8.4–10.5)
CHLORIDE SERPL-SCNC: 107 MMOL/L — SIGNIFICANT CHANGE UP (ref 96–108)
CHLORIDE SERPL-SCNC: 108 MMOL/L — SIGNIFICANT CHANGE UP (ref 96–108)
CO2 SERPL-SCNC: 22 MMOL/L — SIGNIFICANT CHANGE UP (ref 22–31)
CO2 SERPL-SCNC: 24 MMOL/L — SIGNIFICANT CHANGE UP (ref 22–31)
COLOR SPEC: YELLOW — SIGNIFICANT CHANGE UP
CREAT ?TM UR-MCNC: 160 MG/DL — SIGNIFICANT CHANGE UP
CREAT SERPL-MCNC: 2.12 MG/DL — HIGH (ref 0.5–1.3)
CREAT SERPL-MCNC: 2.33 MG/DL — HIGH (ref 0.5–1.3)
DIFF PNL FLD: NEGATIVE — SIGNIFICANT CHANGE UP
EGFR: 19 ML/MIN/1.73M2 — LOW
EGFR: 21 ML/MIN/1.73M2 — LOW
EOSINOPHIL # BLD AUTO: 0.23 K/UL — SIGNIFICANT CHANGE UP (ref 0–0.5)
EOSINOPHIL NFR BLD AUTO: 5.4 % — SIGNIFICANT CHANGE UP (ref 0–6)
EPI CELLS # UR: SIGNIFICANT CHANGE UP /HPF (ref 0–5)
GLUCOSE BLDC GLUCOMTR-MCNC: 118 MG/DL — HIGH (ref 70–99)
GLUCOSE BLDC GLUCOMTR-MCNC: 140 MG/DL — HIGH (ref 70–99)
GLUCOSE BLDC GLUCOMTR-MCNC: 147 MG/DL — HIGH (ref 70–99)
GLUCOSE BLDC GLUCOMTR-MCNC: 167 MG/DL — HIGH (ref 70–99)
GLUCOSE SERPL-MCNC: 145 MG/DL — HIGH (ref 70–99)
GLUCOSE SERPL-MCNC: 167 MG/DL — HIGH (ref 70–99)
GLUCOSE UR QL: NEGATIVE — SIGNIFICANT CHANGE UP
HCT VFR BLD CALC: 29.9 % — LOW (ref 34.5–45)
HGB BLD-MCNC: 9.4 G/DL — LOW (ref 11.5–15.5)
IMM GRANULOCYTES NFR BLD AUTO: 0.7 % — SIGNIFICANT CHANGE UP (ref 0–0.9)
KETONES UR-MCNC: NEGATIVE — SIGNIFICANT CHANGE UP
LEUKOCYTE ESTERASE UR-ACNC: NEGATIVE — SIGNIFICANT CHANGE UP
LYMPHOCYTES # BLD AUTO: 0.97 K/UL — LOW (ref 1–3.3)
LYMPHOCYTES # BLD AUTO: 22.7 % — SIGNIFICANT CHANGE UP (ref 13–44)
MAGNESIUM SERPL-MCNC: 1.9 MG/DL — SIGNIFICANT CHANGE UP (ref 1.6–2.6)
MCHC RBC-ENTMCNC: 31.4 GM/DL — LOW (ref 32–36)
MCHC RBC-ENTMCNC: 32.1 PG — SIGNIFICANT CHANGE UP (ref 27–34)
MCV RBC AUTO: 102 FL — HIGH (ref 80–100)
MONOCYTES # BLD AUTO: 0.4 K/UL — SIGNIFICANT CHANGE UP (ref 0–0.9)
MONOCYTES NFR BLD AUTO: 9.4 % — SIGNIFICANT CHANGE UP (ref 2–14)
NEUTROPHILS # BLD AUTO: 2.6 K/UL — SIGNIFICANT CHANGE UP (ref 1.8–7.4)
NEUTROPHILS NFR BLD AUTO: 60.9 % — SIGNIFICANT CHANGE UP (ref 43–77)
NITRITE UR-MCNC: NEGATIVE — SIGNIFICANT CHANGE UP
NRBC # BLD: 0 /100 WBCS — SIGNIFICANT CHANGE UP (ref 0–0)
NT-PROBNP SERPL-SCNC: HIGH PG/ML (ref 0–300)
PH UR: 6 — SIGNIFICANT CHANGE UP (ref 5–8)
PHOSPHATE SERPL-MCNC: 4 MG/DL — SIGNIFICANT CHANGE UP (ref 2.5–4.5)
PLATELET # BLD AUTO: 275 K/UL — SIGNIFICANT CHANGE UP (ref 150–400)
POTASSIUM SERPL-MCNC: 4.9 MMOL/L — SIGNIFICANT CHANGE UP (ref 3.5–5.3)
POTASSIUM SERPL-MCNC: 4.9 MMOL/L — SIGNIFICANT CHANGE UP (ref 3.5–5.3)
POTASSIUM SERPL-SCNC: 4.9 MMOL/L — SIGNIFICANT CHANGE UP (ref 3.5–5.3)
POTASSIUM SERPL-SCNC: 4.9 MMOL/L — SIGNIFICANT CHANGE UP (ref 3.5–5.3)
PROT UR-MCNC: 100 MG/DL
RBC # BLD: 2.93 M/UL — LOW (ref 3.8–5.2)
RBC # FLD: 14.5 % — SIGNIFICANT CHANGE UP (ref 10.3–14.5)
RBC CASTS # UR COMP ASSIST: < 5 /HPF — SIGNIFICANT CHANGE UP
SODIUM SERPL-SCNC: 138 MMOL/L — SIGNIFICANT CHANGE UP (ref 135–145)
SODIUM SERPL-SCNC: 139 MMOL/L — SIGNIFICANT CHANGE UP (ref 135–145)
SODIUM UR-SCNC: 48 MMOL/L — SIGNIFICANT CHANGE UP
SP GR SPEC: 1.01 — SIGNIFICANT CHANGE UP (ref 1–1.03)
UROBILINOGEN FLD QL: 1 E.U./DL — SIGNIFICANT CHANGE UP
UUN UR-MCNC: 424 MG/DL — SIGNIFICANT CHANGE UP
WBC # BLD: 4.27 K/UL — SIGNIFICANT CHANGE UP (ref 3.8–10.5)
WBC # FLD AUTO: 4.27 K/UL — SIGNIFICANT CHANGE UP (ref 3.8–10.5)
WBC UR QL: < 5 /HPF — SIGNIFICANT CHANGE UP

## 2023-04-28 PROCEDURE — 71045 X-RAY EXAM CHEST 1 VIEW: CPT | Mod: 26

## 2023-04-28 PROCEDURE — 99232 SBSQ HOSP IP/OBS MODERATE 35: CPT

## 2023-04-28 RX ORDER — HYDRALAZINE HCL 50 MG
25 TABLET ORAL ONCE
Refills: 0 | Status: COMPLETED | OUTPATIENT
Start: 2023-04-28 | End: 2023-04-28

## 2023-04-28 RX ORDER — SODIUM CHLORIDE 9 MG/ML
1000 INJECTION INTRAMUSCULAR; INTRAVENOUS; SUBCUTANEOUS
Refills: 0 | Status: DISCONTINUED | OUTPATIENT
Start: 2023-04-28 | End: 2023-04-29

## 2023-04-28 RX ORDER — HYDRALAZINE HCL 50 MG
75 TABLET ORAL EVERY 8 HOURS
Refills: 0 | Status: DISCONTINUED | OUTPATIENT
Start: 2023-04-28 | End: 2023-04-29

## 2023-04-28 RX ADMIN — BRIMONIDINE TARTRATE 1 DROP(S): 2 SOLUTION/ DROPS OPHTHALMIC at 05:57

## 2023-04-28 RX ADMIN — DORZOLAMIDE HYDROCHLORIDE TIMOLOL MALEATE 1 DROP(S): 20; 5 SOLUTION/ DROPS OPHTHALMIC at 22:28

## 2023-04-28 RX ADMIN — Medication 2: at 22:21

## 2023-04-28 RX ADMIN — LATANOPROST 1 DROP(S): 0.05 SOLUTION/ DROPS OPHTHALMIC; TOPICAL at 22:29

## 2023-04-28 RX ADMIN — BRIMONIDINE TARTRATE 1 DROP(S): 2 SOLUTION/ DROPS OPHTHALMIC at 17:07

## 2023-04-28 RX ADMIN — SENNA PLUS 2 TABLET(S): 8.6 TABLET ORAL at 22:26

## 2023-04-28 RX ADMIN — Medication 75 MILLIGRAM(S): at 14:40

## 2023-04-28 RX ADMIN — ISOSORBIDE DINITRATE 10 MILLIGRAM(S): 5 TABLET ORAL at 05:56

## 2023-04-28 RX ADMIN — Medication 25 MILLIGRAM(S): at 01:02

## 2023-04-28 RX ADMIN — HEPARIN SODIUM 5000 UNIT(S): 5000 INJECTION INTRAVENOUS; SUBCUTANEOUS at 22:26

## 2023-04-28 RX ADMIN — Medication 75 MILLIGRAM(S): at 05:56

## 2023-04-28 RX ADMIN — HEPARIN SODIUM 5000 UNIT(S): 5000 INJECTION INTRAVENOUS; SUBCUTANEOUS at 10:47

## 2023-04-28 RX ADMIN — POLYETHYLENE GLYCOL 3350 17 GRAM(S): 17 POWDER, FOR SOLUTION ORAL at 10:47

## 2023-04-28 RX ADMIN — AMLODIPINE BESYLATE 10 MILLIGRAM(S): 2.5 TABLET ORAL at 07:12

## 2023-04-28 RX ADMIN — ISOSORBIDE DINITRATE 10 MILLIGRAM(S): 5 TABLET ORAL at 17:07

## 2023-04-28 RX ADMIN — Medication 75 MILLIGRAM(S): at 22:26

## 2023-04-28 RX ADMIN — ATORVASTATIN CALCIUM 10 MILLIGRAM(S): 80 TABLET, FILM COATED ORAL at 22:26

## 2023-04-28 RX ADMIN — ISOSORBIDE DINITRATE 10 MILLIGRAM(S): 5 TABLET ORAL at 10:47

## 2023-04-28 RX ADMIN — SODIUM CHLORIDE 100 MILLILITER(S): 9 INJECTION INTRAMUSCULAR; INTRAVENOUS; SUBCUTANEOUS at 15:20

## 2023-04-28 RX ADMIN — DORZOLAMIDE HYDROCHLORIDE TIMOLOL MALEATE 1 DROP(S): 20; 5 SOLUTION/ DROPS OPHTHALMIC at 10:47

## 2023-04-28 NOTE — PROGRESS NOTE ADULT - PROBLEM SELECTOR PLAN 1
Presenting with SOB, +JVD, + bilateral pleural effusions, satting 95% on RA, however, BNP: 83892  - Never has had hx of CHF in past, but has heart murmur (likely AS)  - CTA PE Protocol 4/25/23: Cardiomegaly with bilateral pleural effusions, a mosaic attenuation pattern in the lungs, and interlobular septal thickening. Dilated main pulmonary artery.  - EKG: NSR @ 65 bpm, ST depressions in I.   - DIURESIS: s/p Lasix 40 mg IVP x 1 in ED  - TTE: normal LV/RV, severely dilated L atrium, moderate AS    Plan:  - continue hydralazine 50mg TID, isordil 10mg TID   - d/c nebivolol d/t bradycardia   - d/c Lasix  - Core measures, Strict I's and O's, fluid restriction Presenting with SOB, +JVD, + bilateral pleural effusions, satting 95% on RA, however, BNP: 08527  No known hx of CHF.   - CTA PE Protocol 4/25/23: Cardiomegaly with bilateral pleural effusions, a mosaic attenuation pattern in the lungs, and interlobular septal thickening. Dilated main pulmonary artery.  - EKG: NSR @ 65 bpm, ST depressions in I.   - DIURESIS: s/p Lasix 40 mg IVP x 1 in ED  - TTE: normal LV/RV, severely dilated L atrium, moderate AS    Plan:  - no further diuresis   - increase hydralazine 75mg TID, c/w isordil 10mg TID   - Core measures, Strict I's and O's, fluid restriction

## 2023-04-28 NOTE — DIETITIAN INITIAL EVALUATION ADULT - PERTINENT MEDS FT
MEDICATIONS  (STANDING):  amLODIPine   Tablet 10 milliGRAM(s) Oral daily  atorvastatin 10 milliGRAM(s) Oral at bedtime  brimonidine 0.2% Ophthalmic Solution 1 Drop(s) Both EYES two times a day  dextrose 5%. 1000 milliLiter(s) (100 mL/Hr) IV Continuous <Continuous>  dextrose 5%. 1000 milliLiter(s) (50 mL/Hr) IV Continuous <Continuous>  dextrose 50% Injectable 25 Gram(s) IV Push once  dextrose 50% Injectable 12.5 Gram(s) IV Push once  dextrose 50% Injectable 25 Gram(s) IV Push once  dorzolamide 2%/timolol 0.5% Ophthalmic Solution 1 Drop(s) Both EYES two times a day  glucagon  Injectable 1 milliGRAM(s) IntraMuscular once  heparin   Injectable 5000 Unit(s) SubCutaneous every 12 hours  hydrALAZINE 75 milliGRAM(s) Oral every 8 hours  insulin lispro (ADMELOG) corrective regimen sliding scale   SubCutaneous Before meals and at bedtime  isosorbide   dinitrate Tablet (ISORDIL) 10 milliGRAM(s) Oral three times a day  latanoprost 0.005% Ophthalmic Solution 1 Drop(s) Both EYES at bedtime  polyethylene glycol 3350 17 Gram(s) Oral daily  senna 2 Tablet(s) Oral at bedtime    MEDICATIONS  (PRN):  dextrose Oral Gel 15 Gram(s) Oral once PRN Blood Glucose LESS THAN 70 milliGRAM(s)/deciliter

## 2023-04-28 NOTE — DIETITIAN INITIAL EVALUATION ADULT - OTHER CALCULATIONS
4'11'' IBW 98 pounds +-10%  Wt103 pounds BMI20.7 %KLU619  IBW for EER D/t Varying EMR wts in CHF pt  Adjust for age CHF/Renal, fluids per team

## 2023-04-28 NOTE — PROGRESS NOTE ADULT - PROBLEM SELECTOR PLAN 3
Hgb/Hct: 10.0/31.5  - denies blood hematuria/hematochieza   - F/u iron/b12,folate No known hx of CKD, BUN/Cr: 20/1.73  - likely cardiorenal congestion   - Cr improving    Plan:  - no more diuresis needed at this time  - holding valsartan 320 mg daily No known hx of CKD, BUN/Cr: 20/1.73  - likely component cardiorenal congestion vs diuresis   - Cr continuing to rise, now 2.33; FeUrea indicative of pre-renal etiology      Plan:  - no more diuresis needed at this time  - give 500cc NS, 100cc/h x 5h  - holding valsartan 320 mg daily

## 2023-04-28 NOTE — DIETITIAN INITIAL EVALUATION ADULT - ENTER TO (CAL/KG)
INR not at goal. Medications, chart, and patient findings reviewed. Patient reports the following changes since their last visit: Patient son Mr. Shelby questioned regarding elevated INR. Proper warfarin dosing confirmed. Per Mr. Shelby, the patient has been eating grapefruit.  Mr. Shelby denied any other changes in medication and diet at this time. Of note, it was reported that the patient had a positive FOBT on 4/5, and his hemoglobin for today down to 8.2. Mr. Shelby is in communication with the patient's PCP. 4/10 warfarin already taken. Mr. Shelby advise to take the patient to the ED for s/sx of bleeding.. See calendar for adjustments to dose and follow up plan.      30

## 2023-04-28 NOTE — PROGRESS NOTE ADULT - NSPROGADDITIONALINFOA_GEN_ALL_CORE
Attending Attestation:  I was physically present for the key portions of the evaluation and management (E/M) service provided.  I agree with the above history, physical, and plan which I have reviewed with the following edits/addendum:    Attending Attestation:  I was physically present for the key portions of the evaluation and management (E/M) service provided.  I agree with the above history, physical, and plan which I have reviewed with the following edits/addendum:    89F w HTN HLP DMT2 p/w worsening DINH and elevated BPs (216/87). Initial pertinent labs crea 1.7 (baseline ~1 - 2017), BNP 13K, hgb 10. CT PE negative, + R pleural effusion. ECG sinus, non ischemic. Exam w significant long crescendo holosystolic murmur heard best in aortic position.    - TTE to evaluate cardiac structure particularly valves-- moderate AS, preserved LVEF, diastolic dysfxn. Structural team saw and will arrange outpatient follow up for potential intervention when renal fxn back to baseline  - HTN mgt: hydral 25-->50 tid, start isordil 10 (ok to continue amlod, nebivolol)  - f/u renin activity, elevated iliana level 25.3-- may benefit from MRA when renal fxn improves  - recheck BMP post IVF repletion. Home w PT recommended when medically ready     John Ortega MD  Cardiology    35 minutes spent on total encounter; more than 50% of the visit was spent counseling and/or coordinating care by the attending physician.    John Ortega MD  Cardiology    35 minutes spent on total encounter; more than 50% of the visit was spent counseling and/or coordinating care by the attending physician. Attending Attestation:  I was physically present for the key portions of the evaluation and management (E/M) service provided.  I agree with the above history, physical, and plan which I have reviewed with the following edits/addendum:    Attending Attestation:  I was physically present for the key portions of the evaluation and management (E/M) service provided.  I agree with the above history, physical, and plan which I have reviewed with the following edits/addendum:    89F w HTN HLP DMT2 p/w worsening DINH and elevated BPs (216/87). Initial pertinent labs crea 1.7 (baseline ~1 - 2017), BNP 13K, hgb 10. CT PE negative, + R pleural effusion. ECG sinus, non ischemic. Exam w significant long crescendo holosystolic murmur heard best in aortic position.    - TTE 4/27: moderate AS, preserved LVEF, diastolic dysfxn. Structural team saw and will arrange outpatient follow up for potential intervention when renal fxn back to baseline  - HTN mgt: hydral 25-->50 tid, start isordil 10 (ok to continue amlod, DC nebivolol due to bradycardia)  - f/u renin activity, elevated iliana level 25.3-- may benefit from MRA when renal fxn improves  - recheck BMP post IVF repletion. Home w PT recommended when medically ready     John Ortega MD  Cardiology    35 minutes spent on total encounter; more than 50% of the visit was spent counseling and/or coordinating care by the attending physician.    John Ortega MD  Cardiology    35 minutes spent on total encounter; more than 50% of the visit was spent counseling and/or coordinating care by the attending physician. Attending Attestation:  I was physically present for the key portions of the evaluation and management (E/M) service provided.  I agree with the above history, physical, and plan which I have reviewed with the following edits/addendum:    89F w HTN HLP DMT2 p/w worsening DINH and elevated BPs (216/87). Initial pertinent labs crea 1.7 (baseline ~1 - 2017), BNP 13K, hgb 10. CT PE negative, + R pleural effusion. ECG sinus, non ischemic. Exam w significant long crescendo holosystolic murmur heard best in aortic position.  - TTE 4/27: moderate AS, preserved LVEF, diastolic dysfxn. Structural team saw and will arrange outpatient follow up for potential intervention when renal fxn back to baseline  - HTN mgt: hydral 25-->50 tid, start isordil 10 (ok to continue amlod, DC nebivolol due to bradycardia)  - f/u renin activity, elevated iliana level 25.3-- may benefit from MRA when renal fxn improves  - recheck BMP post IVF repletion. Home w PT recommended when medically ready     John Ortega MD  Cardiology    35 minutes spent on total encounter; more than 50% of the visit was spent counseling and/or coordinating care by the attending physician.

## 2023-04-28 NOTE — DIETITIAN INITIAL EVALUATION ADULT - PERSON TAUGHT/METHOD
Provided diet education today to pt family at bedside/verbal instruction/teach back - (Patient repeats in own words)

## 2023-04-28 NOTE — PROGRESS NOTE ADULT - PROBLEM SELECTOR PLAN 2
Presenting in acute heart failure and AUGUSTUS and hypertensive w/ SBP to 200s d/t medication miscommunication. Pt was meant to add hydralazine 25mg TID to existing regimen, instead replaced it and stopped amlodipine-valsartan, leading to HTN emergency with pulm edema/CHF.   - S/P Amlodipine 10 mg PO x 1 in ED, Labetalol 10 mg IVP x 2, Lasix 40 mg IVP x 1   Plan:  - c/w amlodipine 10 mg daily (home med) and Hydralazine 50 mg TID, and isordil 10mg TID  - d/c nebivolol d/t bradycardia  - d/c Lasix  - Goal for SBP 140s-150s Presenting in acute heart failure and AUGUSTUS and hypertensive w/ SBP to 200s d/t medication miscommunication. Pt was meant to add hydralazine 25mg TID to existing regimen, instead replaced it and stopped amlodipine-valsartan, leading to HTN emergency with pulm edema/CHF.   - S/P Amlodipine 10 mg PO x 1 in ED, Labetalol 10 mg IVP x 2, Lasix 40 mg IVP x 1   Plan:  - c/w amlodipine 10 mg daily (home med) and Hydralazine 75 mg TID, and isordil 10mg TID  - Goal for SBP 130s Presenting in acute heart failure and AUGUSTUS and hypertensive w/ SBP to 200s d/t medication miscommunication. Pt was meant to add hydralazine 25mg TID to existing regimen, instead replaced it and stopped amlodipine-valsartan, leading to HTN emergency with pulm edema/CHF.   - S/P Amlodipine 10 mg PO x 1 in ED, Labetalol 10 mg IVP x 2, Lasix 40 mg IVP x 1   - elevated aldosterone, 25.3  Plan:  - c/w amlodipine 10 mg daily (home med) and Hydralazine 75 mg TID, and isordil 10mg TID  - Goal for SBP 130s

## 2023-04-28 NOTE — PROVIDER CONTACT NOTE (OTHER) - ASSESSMENT
Patient AOx4. VSS. 95% on room air. Lungs clear upon auscultation. Patient not appearing tachypnic, RR 18. Patient AOx4. VSS. 95% on room air. Lungs with fine crackles in bases upon auscultation. Patient not appearing tachypnic, RR 18.

## 2023-04-28 NOTE — PROGRESS NOTE ADULT - SUBJECTIVE AND OBJECTIVE BOX
Internal Medicine Progress Note  Katherine Jackson, PGY-1    ******INCOMPLETE******    OVERNIGHT EVENTS/INTERVAL HPI:    OBJECTIVE:  Vital Signs Last 24 Hrs  T(C): 37.2 (28 Apr 2023 08:58), Max: 37.3 (28 Apr 2023 06:09)  T(F): 98.9 (28 Apr 2023 08:58), Max: 99.1 (28 Apr 2023 06:09)  HR: 61 (28 Apr 2023 10:49) (58 - 62)  BP: 133/59 (28 Apr 2023 10:49) (133/59 - 190/85)  BP(mean): 94 (28 Apr 2023 07:12) (94 - 122)  RR: 16 (28 Apr 2023 08:53) (16 - 18)  SpO2: 96% (28 Apr 2023 07:12) (92% - 98%)    Parameters below as of 28 Apr 2023 08:53  Patient On (Oxygen Delivery Method): room air      I&O's Detail    27 Apr 2023 07:01  -  28 Apr 2023 07:00  --------------------------------------------------------  IN:    Oral Fluid: 360 mL  Total IN: 360 mL    OUT:    Voided (mL): 400 mL  Total OUT: 400 mL    Total NET: -40 mL      28 Apr 2023 07:01  -  28 Apr 2023 11:29  --------------------------------------------------------  IN:    Oral Fluid: 180 mL  Total IN: 180 mL    OUT:  Total OUT: 0 mL    Total NET: 180 mL        Physical Exam:  GENERAL: Awake, alert and interactive, no acute distress, appears comfortable  NEURO: A&Ox4, no focal deficits, 5/5 strength in all ext, reflexes 2+ throughout, CN 2-12 intact  HEENT: Normocephalic, atraumatic, no conjunctivitis or scleral icterus, oral mucosa moist, no oral lesions noted  NECK: Supple, no LAD, no JVD, thyroid not palpable  CARDIAC: Regular rate and rhythm, +S1/S2, no murmurs/rubs/gallops  PULM: Breathing comfortably on RA, clear to auscultation bilaterally, no wheezes/rales/rhonchi  ABDOMEN: Soft, nontender, nondistended, +bs, no hepatosplenomegaly, no rebound tenderness or fluid wave, no CVA tenderness  : Deferred  MSK: Range of motion grossly intact, no back tenderness  SKIN: Warm and dry, no rashes, lesions  VASC: Cap refil < 2 sec, 2+ peripheral pulses, no edema, no LE tenderness  Psych: Appropriate affect    Medications:  MEDICATIONS  (STANDING):  amLODIPine   Tablet 10 milliGRAM(s) Oral daily  atorvastatin 10 milliGRAM(s) Oral at bedtime  brimonidine 0.2% Ophthalmic Solution 1 Drop(s) Both EYES two times a day  dextrose 5%. 1000 milliLiter(s) (100 mL/Hr) IV Continuous <Continuous>  dextrose 5%. 1000 milliLiter(s) (50 mL/Hr) IV Continuous <Continuous>  dextrose 50% Injectable 25 Gram(s) IV Push once  dextrose 50% Injectable 25 Gram(s) IV Push once  dextrose 50% Injectable 12.5 Gram(s) IV Push once  dorzolamide 2%/timolol 0.5% Ophthalmic Solution 1 Drop(s) Both EYES two times a day  glucagon  Injectable 1 milliGRAM(s) IntraMuscular once  heparin   Injectable 5000 Unit(s) SubCutaneous every 12 hours  hydrALAZINE 75 milliGRAM(s) Oral every 8 hours  insulin lispro (ADMELOG) corrective regimen sliding scale   SubCutaneous Before meals and at bedtime  isosorbide   dinitrate Tablet (ISORDIL) 10 milliGRAM(s) Oral three times a day  latanoprost 0.005% Ophthalmic Solution 1 Drop(s) Both EYES at bedtime  polyethylene glycol 3350 17 Gram(s) Oral daily  senna 2 Tablet(s) Oral at bedtime    MEDICATIONS  (PRN):  dextrose Oral Gel 15 Gram(s) Oral once PRN Blood Glucose LESS THAN 70 milliGRAM(s)/deciliter      Labs:                        9.4    4.27  )-----------( 275      ( 28 Apr 2023 06:47 )             29.9     04-28    139  |  108  |  23  ----------------------------<  145<H>  4.9   |  22  |  2.12<H>    Ca    8.7      28 Apr 2023 06:47  Phos  4.0     04-28  Mg     1.9     04-28    TPro  7.2  /  Alb  3.0<L>  /  TBili  0.3  /  DBili  x   /  AST  16  /  ALT  <5<L>  /  AlkPhos  64  04-27              Radiology: Reviewed OVERNIGHT EVENTS/INTERVAL HPI: No acute events overnight. Pt examined at bedside where she was sleeping comfortably. Pt has no acute complaints. Denies HA, dyspnea, chest pain, abdominal pain.     OBJECTIVE:  Vital Signs Last 24 Hrs  T(C): 37.2 (28 Apr 2023 08:58), Max: 37.3 (28 Apr 2023 06:09)  T(F): 98.9 (28 Apr 2023 08:58), Max: 99.1 (28 Apr 2023 06:09)  HR: 61 (28 Apr 2023 10:49) (58 - 62)  BP: 133/59 (28 Apr 2023 10:49) (133/59 - 190/85)  BP(mean): 94 (28 Apr 2023 07:12) (94 - 122)  RR: 16 (28 Apr 2023 08:53) (16 - 18)  SpO2: 96% (28 Apr 2023 07:12) (92% - 98%)    Parameters below as of 28 Apr 2023 08:53  Patient On (Oxygen Delivery Method): room air      I&O's Detail    27 Apr 2023 07:01  -  28 Apr 2023 07:00  --------------------------------------------------------  IN:    Oral Fluid: 360 mL  Total IN: 360 mL    OUT:    Voided (mL): 400 mL  Total OUT: 400 mL    Total NET: -40 mL      28 Apr 2023 07:01  -  28 Apr 2023 11:29  --------------------------------------------------------  IN:    Oral Fluid: 180 mL  Total IN: 180 mL    OUT:  Total OUT: 0 mL    Total NET: 180 mL    Physical Exam:  GENERAL: Awake, alert and interactive, no acute distress, appears comfortable, thin  NEURO: A&Ox3, no focal deficits  HEENT: no conjunctivitis or scleral icterus, glaucoma, pupils not tracking during exam (d/t blindness), oral mucosa moist  NECK: Supple, no LAD, no JVD  CARDIAC: Regular rate and rhythm, +S1/S2, loud crescendo/decrescendo systolic murmur most significant R USB. no rubs/gallops  PULM: Breathing comfortably on RA, clear to auscultation bilaterally, no wheezes/rales/rhonchi  ABDOMEN: Soft, nontender, nondistended, +bs, no hepatosplenomegaly, no rebound tenderness or fluid wave, no CVA tenderness  SKIN: Warm and dry, no rashes, lesions  VASC: Cap refill < 2 sec, 1+ peripheral pulses, no edema, no LE tenderness  Psych: Appropriate affect    Medications:  MEDICATIONS  (STANDING):  amLODIPine   Tablet 10 milliGRAM(s) Oral daily  atorvastatin 10 milliGRAM(s) Oral at bedtime  brimonidine 0.2% Ophthalmic Solution 1 Drop(s) Both EYES two times a day  dextrose 5%. 1000 milliLiter(s) (100 mL/Hr) IV Continuous <Continuous>  dextrose 5%. 1000 milliLiter(s) (50 mL/Hr) IV Continuous <Continuous>  dextrose 50% Injectable 25 Gram(s) IV Push once  dextrose 50% Injectable 25 Gram(s) IV Push once  dextrose 50% Injectable 12.5 Gram(s) IV Push once  dorzolamide 2%/timolol 0.5% Ophthalmic Solution 1 Drop(s) Both EYES two times a day  glucagon  Injectable 1 milliGRAM(s) IntraMuscular once  heparin   Injectable 5000 Unit(s) SubCutaneous every 12 hours  hydrALAZINE 75 milliGRAM(s) Oral every 8 hours  insulin lispro (ADMELOG) corrective regimen sliding scale   SubCutaneous Before meals and at bedtime  isosorbide   dinitrate Tablet (ISORDIL) 10 milliGRAM(s) Oral three times a day  latanoprost 0.005% Ophthalmic Solution 1 Drop(s) Both EYES at bedtime  polyethylene glycol 3350 17 Gram(s) Oral daily  senna 2 Tablet(s) Oral at bedtime    MEDICATIONS  (PRN):  dextrose Oral Gel 15 Gram(s) Oral once PRN Blood Glucose LESS THAN 70 milliGRAM(s)/deciliter      Labs:                        9.4    4.27  )-----------( 275      ( 28 Apr 2023 06:47 )             29.9     04-28    139  |  108  |  23  ----------------------------<  145<H>  4.9   |  22  |  2.12<H>    Ca    8.7      28 Apr 2023 06:47  Phos  4.0     04-28  Mg     1.9     04-28    TPro  7.2  /  Alb  3.0<L>  /  TBili  0.3  /  DBili  x   /  AST  16  /  ALT  <5<L>  /  AlkPhos  64  04-27              Radiology: Reviewed

## 2023-04-28 NOTE — DIETITIAN INITIAL EVALUATION ADULT - LAB (SPECIFY)
----- Message from Odalis Peter sent at 8/1/2017 10:47 AM CDT -----  Contact: Brandy Eric [phone]  This message is for #9 Mom said Dad came home today after an appointment with   Sib Kasandra Rm telling Mom that #9 said she can bring Piper in any day for [employer]/ follow up UTI appointment this week. Please let  know if this is ok to schedule. Thanks  
monitor BMP, CBC, glucose, lytes - Replete PRN, trend renal indices, LFTs, POCT
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
## 2023-04-28 NOTE — DIETITIAN INITIAL EVALUATION ADULT - PROBLEM SELECTOR PLAN 2
Presenting in acute heart failure and AUGUSTUS and hypertensive w/ SBP to 200s  - S/P Amlodipine 10 mg PO x 1 in ED, Labetalol 10 mg IVP x 2, Lasix 40 mg IVP x 1   - Will give Hydralazine 10 mg IVP x 1  -  continue diuresis with lasix 20 mg IVP BID (lasix naive)   - Continue nebivolol 10 mg daily (home med), amlodipine 10 mg daily (home med) and  Hydralazine 25 mg TID   - Goal for SBP 160s-170s

## 2023-04-28 NOTE — PROGRESS NOTE ADULT - PROBLEM SELECTOR PLAN 7
F: none   E: K >4, MG >2  N: DASH, TLC, fluid restriction   DVT: heparin 5000IU subq q8h  Dispo: pending clinical progression  PT ordered  Patient would like to be FULL CODE

## 2023-04-28 NOTE — PROVIDER CONTACT NOTE (OTHER) - ACTION/TREATMENT ORDERED:
Sat HOB up. Notified MD. 0.9% NS was running at 100 ml/hr for 5 hours. Per MD, stop fluids (pt received approx 250 cc).

## 2023-04-28 NOTE — PROGRESS NOTE ADULT - ASSESSMENT
89 y/o F, w/ PMHx of HTN, HLD, DM (however not on meds), who presented to [facility] ED on 4/25/23 with her daughter, with complaints of worsening shortness of breath, orthopnea, chest pressure over the past week i/s/o discontinuing amlodipine-valsartan 10-320mg due to miscommunication with PMD, admitted to cardiology service for hypertensive emergency and acute CHF 2/2 aortic stenosis. Pt was told to start hydralazine 25 mg TID, mistakenly thought she was meant to replace her amlodipine-valsartan instead of adding it. holding area

## 2023-04-28 NOTE — PROGRESS NOTE ADULT - PROBLEM SELECTOR PLAN 4
No known hx of CKD, BUN/Cr: 20/1.73  - likely cardiorenal congestion  - Cr improving    Plan:  - no more diuresis needed at this time  - holding valsartan 320 mg daily Hgb/Hct: 10.0/31  - denies blood hematuria/hematochezia    TIBC dec. Total Fe/% sat WNL.   Folate, B12 WNL

## 2023-04-28 NOTE — DIETITIAN INITIAL EVALUATION ADULT - PERTINENT LABORATORY DATA
04-28    139  |  108  |  23  ----------------------------<  145<H>  4.9   |  22  |  2.12<H>    Ca    8.7      28 Apr 2023 06:47  Phos  4.0     04-28  Mg     1.9     04-28    TPro  7.2  /  Alb  3.0<L>  /  TBili  0.3  /  DBili  x   /  AST  16  /  ALT  <5<L>  /  AlkPhos  64  04-27  POCT Blood Glucose.: 140 mg/dL (04-28-23 @ 06:38)  A1C with Estimated Average Glucose Result: 5.7 % (04-26-23 @ 05:30)

## 2023-04-28 NOTE — PROGRESS NOTE ADULT - PROBLEM SELECTOR PROBLEM 1
Acute CHF (congestive heart failure)

## 2023-04-28 NOTE — DIETITIAN INITIAL EVALUATION ADULT - OTHER INFO
89 y/o F, PMHx HTN, HLD, DM (however not on meds), who presented to [facility] ED 4/25/23 with her daughter, with complaints of worsening shortness of breath over the past week. In the ED, overloaded on exam, CT chest with b/l pleural effusions, [place] [address]. BP elevated to 216/87 mmHg. Pt admitted to cardiology/telemetry for hypertensive emergency and acute heart failure. TTE completed with moderate AS. Structural heart team consulted for further evaluation for AS- noted plan to f/u OP.     Pt seen this AM on 5UR. Pt sleeping. Spoke with RN, family at bedside. /65. Reported pt has been eating however does not like meals very much. Diet order: DASH TLC, CONSCHO 1500ml fluid restriction - assume can d/c CONSCHO 2/2 noted a1c 5.7% in older adult. Asking for MRS VINCENT, reports has not been given with meals. NKFA. No issues chewing/swallowing. PTA reported PO intake is somewhat decreased however feels d/t adv age of which family has made PCP aware of. Did not provide wt hx. Admit wt 103 pounds, Most recent EMR wt 108.6 4/27. Assume wts to vary in setting of fluids and CHF. No edema at this time, hydrALAZINE remains ordered. No pain. Todd 18. BM+4/23 per flow sheets; constipation noted. Ordered for Miralax and senna. Lipids WDL. No pressure ulcers.   Please see below for nutritions recommendations. Paged team

## 2023-04-29 ENCOUNTER — TRANSCRIPTION ENCOUNTER (OUTPATIENT)
Age: 88
End: 2023-04-29

## 2023-04-29 VITALS — WEIGHT: 105.82 LBS

## 2023-04-29 LAB
ANION GAP SERPL CALC-SCNC: 9 MMOL/L — SIGNIFICANT CHANGE UP (ref 5–17)
BASOPHILS # BLD AUTO: 0.04 K/UL — SIGNIFICANT CHANGE UP (ref 0–0.2)
BASOPHILS NFR BLD AUTO: 0.9 % — SIGNIFICANT CHANGE UP (ref 0–2)
BUN SERPL-MCNC: 25 MG/DL — HIGH (ref 7–23)
CALCIUM SERPL-MCNC: 8.1 MG/DL — LOW (ref 8.4–10.5)
CHLORIDE SERPL-SCNC: 105 MMOL/L — SIGNIFICANT CHANGE UP (ref 96–108)
CO2 SERPL-SCNC: 22 MMOL/L — SIGNIFICANT CHANGE UP (ref 22–31)
CREAT ?TM UR-MCNC: 192 MG/DL — SIGNIFICANT CHANGE UP
CREAT SERPL-MCNC: 2.28 MG/DL — HIGH (ref 0.5–1.3)
EGFR: 20 ML/MIN/1.73M2 — LOW
EOSINOPHIL # BLD AUTO: 0.27 K/UL — SIGNIFICANT CHANGE UP (ref 0–0.5)
EOSINOPHIL NFR BLD AUTO: 6 % — SIGNIFICANT CHANGE UP (ref 0–6)
GLUCOSE BLDC GLUCOMTR-MCNC: 115 MG/DL — HIGH (ref 70–99)
GLUCOSE BLDC GLUCOMTR-MCNC: 128 MG/DL — HIGH (ref 70–99)
GLUCOSE SERPL-MCNC: 119 MG/DL — HIGH (ref 70–99)
HCT VFR BLD CALC: 28.1 % — LOW (ref 34.5–45)
HGB BLD-MCNC: 8.6 G/DL — LOW (ref 11.5–15.5)
IMM GRANULOCYTES NFR BLD AUTO: 0.2 % — SIGNIFICANT CHANGE UP (ref 0–0.9)
LYMPHOCYTES # BLD AUTO: 1.14 K/UL — SIGNIFICANT CHANGE UP (ref 1–3.3)
LYMPHOCYTES # BLD AUTO: 25.2 % — SIGNIFICANT CHANGE UP (ref 13–44)
MAGNESIUM SERPL-MCNC: 1.9 MG/DL — SIGNIFICANT CHANGE UP (ref 1.6–2.6)
MCHC RBC-ENTMCNC: 30.6 GM/DL — LOW (ref 32–36)
MCHC RBC-ENTMCNC: 31.5 PG — SIGNIFICANT CHANGE UP (ref 27–34)
MCV RBC AUTO: 102.9 FL — HIGH (ref 80–100)
MONOCYTES # BLD AUTO: 0.63 K/UL — SIGNIFICANT CHANGE UP (ref 0–0.9)
MONOCYTES NFR BLD AUTO: 13.9 % — SIGNIFICANT CHANGE UP (ref 2–14)
NEUTROPHILS # BLD AUTO: 2.44 K/UL — SIGNIFICANT CHANGE UP (ref 1.8–7.4)
NEUTROPHILS NFR BLD AUTO: 53.8 % — SIGNIFICANT CHANGE UP (ref 43–77)
NRBC # BLD: 0 /100 WBCS — SIGNIFICANT CHANGE UP (ref 0–0)
PHOSPHATE SERPL-MCNC: 4.3 MG/DL — SIGNIFICANT CHANGE UP (ref 2.5–4.5)
PLATELET # BLD AUTO: 269 K/UL — SIGNIFICANT CHANGE UP (ref 150–400)
POTASSIUM SERPL-MCNC: 4.2 MMOL/L — SIGNIFICANT CHANGE UP (ref 3.5–5.3)
POTASSIUM SERPL-SCNC: 4.2 MMOL/L — SIGNIFICANT CHANGE UP (ref 3.5–5.3)
RBC # BLD: 2.73 M/UL — LOW (ref 3.8–5.2)
RBC # FLD: 14.2 % — SIGNIFICANT CHANGE UP (ref 10.3–14.5)
SODIUM SERPL-SCNC: 136 MMOL/L — SIGNIFICANT CHANGE UP (ref 135–145)
SODIUM UR-SCNC: 75 MMOL/L — SIGNIFICANT CHANGE UP
UUN UR-MCNC: 558 MG/DL — SIGNIFICANT CHANGE UP
WBC # BLD: 4.53 K/UL — SIGNIFICANT CHANGE UP (ref 3.8–10.5)
WBC # FLD AUTO: 4.53 K/UL — SIGNIFICANT CHANGE UP (ref 3.8–10.5)

## 2023-04-29 PROCEDURE — 85610 PROTHROMBIN TIME: CPT

## 2023-04-29 PROCEDURE — 82607 VITAMIN B-12: CPT

## 2023-04-29 PROCEDURE — 85730 THROMBOPLASTIN TIME PARTIAL: CPT

## 2023-04-29 PROCEDURE — 99239 HOSP IP/OBS DSCHRG MGMT >30: CPT

## 2023-04-29 PROCEDURE — 96375 TX/PRO/DX INJ NEW DRUG ADDON: CPT

## 2023-04-29 PROCEDURE — 85379 FIBRIN DEGRADATION QUANT: CPT

## 2023-04-29 PROCEDURE — 83880 ASSAY OF NATRIURETIC PEPTIDE: CPT

## 2023-04-29 PROCEDURE — 99285 EMERGENCY DEPT VISIT HI MDM: CPT

## 2023-04-29 PROCEDURE — 82728 ASSAY OF FERRITIN: CPT

## 2023-04-29 PROCEDURE — 82570 ASSAY OF URINE CREATININE: CPT

## 2023-04-29 PROCEDURE — 84244 ASSAY OF RENIN: CPT

## 2023-04-29 PROCEDURE — 84484 ASSAY OF TROPONIN QUANT: CPT

## 2023-04-29 PROCEDURE — 82088 ASSAY OF ALDOSTERONE: CPT

## 2023-04-29 PROCEDURE — 84100 ASSAY OF PHOSPHORUS: CPT

## 2023-04-29 PROCEDURE — 84300 ASSAY OF URINE SODIUM: CPT

## 2023-04-29 PROCEDURE — 85025 COMPLETE CBC W/AUTO DIFF WBC: CPT

## 2023-04-29 PROCEDURE — 71045 X-RAY EXAM CHEST 1 VIEW: CPT

## 2023-04-29 PROCEDURE — 83735 ASSAY OF MAGNESIUM: CPT

## 2023-04-29 PROCEDURE — 83540 ASSAY OF IRON: CPT

## 2023-04-29 PROCEDURE — 84540 ASSAY OF URINE/UREA-N: CPT

## 2023-04-29 PROCEDURE — 36415 COLL VENOUS BLD VENIPUNCTURE: CPT

## 2023-04-29 PROCEDURE — 96374 THER/PROPH/DIAG INJ IV PUSH: CPT

## 2023-04-29 PROCEDURE — 93306 TTE W/DOPPLER COMPLETE: CPT

## 2023-04-29 PROCEDURE — 93005 ELECTROCARDIOGRAM TRACING: CPT

## 2023-04-29 PROCEDURE — 97161 PT EVAL LOW COMPLEX 20 MIN: CPT

## 2023-04-29 PROCEDURE — 71275 CT ANGIOGRAPHY CHEST: CPT | Mod: MG

## 2023-04-29 PROCEDURE — 82746 ASSAY OF FOLIC ACID SERUM: CPT

## 2023-04-29 PROCEDURE — G1004: CPT

## 2023-04-29 PROCEDURE — 84443 ASSAY THYROID STIM HORMONE: CPT

## 2023-04-29 PROCEDURE — 84133 ASSAY OF URINE POTASSIUM: CPT

## 2023-04-29 PROCEDURE — 96376 TX/PRO/DX INJ SAME DRUG ADON: CPT

## 2023-04-29 PROCEDURE — 82962 GLUCOSE BLOOD TEST: CPT

## 2023-04-29 PROCEDURE — 83550 IRON BINDING TEST: CPT

## 2023-04-29 PROCEDURE — 83036 HEMOGLOBIN GLYCOSYLATED A1C: CPT

## 2023-04-29 PROCEDURE — 83935 ASSAY OF URINE OSMOLALITY: CPT

## 2023-04-29 PROCEDURE — 80053 COMPREHEN METABOLIC PANEL: CPT

## 2023-04-29 PROCEDURE — 84156 ASSAY OF PROTEIN URINE: CPT

## 2023-04-29 PROCEDURE — 80048 BASIC METABOLIC PNL TOTAL CA: CPT

## 2023-04-29 PROCEDURE — 80061 LIPID PANEL: CPT

## 2023-04-29 PROCEDURE — 81001 URINALYSIS AUTO W/SCOPE: CPT

## 2023-04-29 RX ORDER — AMLODIPINE AND VALSARTAN 5; 320 MG/1; MG/1
1 TABLET, FILM COATED ORAL
Qty: 30 | Refills: 3
Start: 2023-04-29 | End: 2023-08-26

## 2023-04-29 RX ORDER — NEBIVOLOL HYDROCHLORIDE 5 MG/1
1 TABLET ORAL
Refills: 0 | DISCHARGE

## 2023-04-29 RX ORDER — HYDRALAZINE HCL 50 MG
25 TABLET ORAL ONCE
Refills: 0 | Status: COMPLETED | OUTPATIENT
Start: 2023-04-29 | End: 2023-04-29

## 2023-04-29 RX ORDER — FUROSEMIDE 40 MG
1 TABLET ORAL
Qty: 30 | Refills: 2
Start: 2023-04-29 | End: 2023-07-27

## 2023-04-29 RX ORDER — AMLODIPINE AND VALSARTAN 5; 320 MG/1; MG/1
1 TABLET, FILM COATED ORAL
Qty: 30 | Refills: 0
Start: 2023-04-29 | End: 2023-05-28

## 2023-04-29 RX ORDER — AMLODIPINE AND VALSARTAN 5; 320 MG/1; MG/1
1 TABLET, FILM COATED ORAL
Qty: 0 | Refills: 0 | DISCHARGE

## 2023-04-29 RX ADMIN — DORZOLAMIDE HYDROCHLORIDE TIMOLOL MALEATE 1 DROP(S): 20; 5 SOLUTION/ DROPS OPHTHALMIC at 11:51

## 2023-04-29 RX ADMIN — BRIMONIDINE TARTRATE 1 DROP(S): 2 SOLUTION/ DROPS OPHTHALMIC at 05:56

## 2023-04-29 RX ADMIN — HEPARIN SODIUM 5000 UNIT(S): 5000 INJECTION INTRAVENOUS; SUBCUTANEOUS at 11:49

## 2023-04-29 RX ADMIN — POLYETHYLENE GLYCOL 3350 17 GRAM(S): 17 POWDER, FOR SOLUTION ORAL at 11:48

## 2023-04-29 RX ADMIN — Medication 75 MILLIGRAM(S): at 05:55

## 2023-04-29 RX ADMIN — AMLODIPINE BESYLATE 10 MILLIGRAM(S): 2.5 TABLET ORAL at 05:55

## 2023-04-29 RX ADMIN — ISOSORBIDE DINITRATE 10 MILLIGRAM(S): 5 TABLET ORAL at 07:14

## 2023-04-29 RX ADMIN — ISOSORBIDE DINITRATE 10 MILLIGRAM(S): 5 TABLET ORAL at 11:49

## 2023-04-29 RX ADMIN — Medication 25 MILLIGRAM(S): at 07:44

## 2023-04-29 RX ADMIN — Medication 75 MILLIGRAM(S): at 14:09

## 2023-04-29 NOTE — DISCHARGE NOTE NURSING/CASE MANAGEMENT/SOCIAL WORK - NSDCPEFALRISK_GEN_ALL_CORE
For information on Fall & Injury Prevention, visit: [email or web address] OR  [email or web address] OR  https://www.cdc.gov/steadi/patient.html
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
## 2023-04-29 NOTE — DISCHARGE NOTE NURSING/CASE MANAGEMENT/SOCIAL WORK - NSDCFUADDAPPT_GEN_ALL_CORE_FT
Please follow-up with your outpatient physician, Dr. Shane Jones.    *We have contacted Dr. Shane Jones's office, and they will be calling you on Monday to schedule an appointment. We would like for you to be seen within 1 week.     *You also have an appointment with Dr. Geller from Interventional Cardiology. Your appointment is scheduled for: 5/8/2023 @ 1:15PM

## 2023-05-03 DIAGNOSIS — N17.9 ACUTE KIDNEY FAILURE, UNSPECIFIED: ICD-10-CM

## 2023-05-03 DIAGNOSIS — I11.0 HYPERTENSIVE HEART DISEASE WITH HEART FAILURE: ICD-10-CM

## 2023-05-03 DIAGNOSIS — E83.42 HYPOMAGNESEMIA: ICD-10-CM

## 2023-05-03 DIAGNOSIS — I50.31 ACUTE DIASTOLIC (CONGESTIVE) HEART FAILURE: ICD-10-CM

## 2023-05-03 DIAGNOSIS — Z79.82 LONG TERM (CURRENT) USE OF ASPIRIN: ICD-10-CM

## 2023-05-03 DIAGNOSIS — Z90.710 ACQUIRED ABSENCE OF BOTH CERVIX AND UTERUS: ICD-10-CM

## 2023-05-03 DIAGNOSIS — E11.9 TYPE 2 DIABETES MELLITUS WITHOUT COMPLICATIONS: ICD-10-CM

## 2023-05-03 DIAGNOSIS — K86.2 CYST OF PANCREAS: ICD-10-CM

## 2023-05-03 DIAGNOSIS — I35.0 NONRHEUMATIC AORTIC (VALVE) STENOSIS: ICD-10-CM

## 2023-05-03 DIAGNOSIS — E78.5 HYPERLIPIDEMIA, UNSPECIFIED: ICD-10-CM

## 2023-05-03 DIAGNOSIS — D64.9 ANEMIA, UNSPECIFIED: ICD-10-CM

## 2023-05-03 DIAGNOSIS — I16.1 HYPERTENSIVE EMERGENCY: ICD-10-CM

## 2023-05-03 PROBLEM — Z00.00 ENCOUNTER FOR PREVENTIVE HEALTH EXAMINATION: Status: ACTIVE | Noted: 2023-05-03

## 2023-05-08 ENCOUNTER — APPOINTMENT (OUTPATIENT)
Dept: CARDIOTHORACIC SURGERY | Facility: CLINIC | Age: 88
End: 2023-05-08

## 2023-05-09 LAB — RENIN PLAS-CCNC: 0.29 NG/ML/HR — SIGNIFICANT CHANGE UP (ref 0.17–5.38)

## 2023-05-25 NOTE — ED ADULT NURSE NOTE - CAS TRG GENERAL NORM CIRC DET
Strong peripheral pulses/Capillary refill less/equal to 2 seconds
[FreeTextEntry1] : The patient has had increased chest usually after eating . She has had 2 caths in the past  which showed a myocardial bridge but no obstructive CAD . The patient has had parethesias of the head  This was thought to be from her cervical spine . .

## 2023-06-19 ENCOUNTER — APPOINTMENT (OUTPATIENT)
Dept: OTOLARYNGOLOGY | Facility: CLINIC | Age: 88
End: 2023-06-19
Payer: SELF-PAY

## 2023-06-19 ENCOUNTER — NON-APPOINTMENT (OUTPATIENT)
Age: 88
End: 2023-06-19

## 2023-06-19 VITALS
SYSTOLIC BLOOD PRESSURE: 181 MMHG | TEMPERATURE: 97.9 F | HEIGHT: 59 IN | WEIGHT: 109 LBS | BODY MASS INDEX: 21.97 KG/M2 | DIASTOLIC BLOOD PRESSURE: 62 MMHG | HEART RATE: 60 BPM

## 2023-06-19 DIAGNOSIS — Z86.39 PERSONAL HISTORY OF OTHER ENDOCRINE, NUTRITIONAL AND METABOLIC DISEASE: ICD-10-CM

## 2023-06-19 DIAGNOSIS — I51.9 HEART DISEASE, UNSPECIFIED: ICD-10-CM

## 2023-06-19 DIAGNOSIS — H93.13 TINNITUS, BILATERAL: ICD-10-CM

## 2023-06-19 DIAGNOSIS — Z87.891 PERSONAL HISTORY OF NICOTINE DEPENDENCE: ICD-10-CM

## 2023-06-19 DIAGNOSIS — H91.93 UNSPECIFIED HEARING LOSS, BILATERAL: ICD-10-CM

## 2023-06-19 PROCEDURE — 99203 OFFICE O/P NEW LOW 30 MIN: CPT | Mod: NC,25

## 2023-06-20 NOTE — ASSESSMENT
[FreeTextEntry1] : 89 year old female presents with concern for hearing loss and tinnitus. Based on his history and exam findings, I am recommending an audiogram and tympanogram to assess for hearing loss. Patient will follow up with us after testing to review results and discuss next steps. \par \par - audio/tymp\par - fu after testing to review results and discuss next steps\par

## 2023-06-20 NOTE — HISTORY OF PRESENT ILLNESS
[de-identified] : 6/19/[birth year]\par 89F presents with concern for hearing loss for several years. Daughter has noted that patient turns up the volume on the TV. She also reports bilateral tinnitus that is a high pitched ringing, intermittent, nonpulsatile. Denies otalgia, otorrhea or vertiginous symptoms. + q tip use. No other ENT issues.

## 2024-02-05 ENCOUNTER — EMERGENCY (EMERGENCY)
Facility: HOSPITAL | Age: 89
LOS: 1 days | Discharge: HOME | End: 2024-02-05
Attending: EMERGENCY MEDICINE | Admitting: EMERGENCY MEDICINE
Payer: MEDICARE

## 2024-02-05 VITALS
WEIGHT: 108.03 LBS | DIASTOLIC BLOOD PRESSURE: 76 MMHG | RESPIRATION RATE: 18 BRPM | OXYGEN SATURATION: 95 % | SYSTOLIC BLOOD PRESSURE: 192 MMHG | TEMPERATURE: 98 F | HEART RATE: 70 BPM

## 2024-02-05 DIAGNOSIS — Z90.710 ACQUIRED ABSENCE OF BOTH CERVIX AND UTERUS: Chronic | ICD-10-CM

## 2024-02-05 PROCEDURE — 99283 EMERGENCY DEPT VISIT LOW MDM: CPT

## 2024-02-05 PROCEDURE — 99282 EMERGENCY DEPT VISIT SF MDM: CPT

## 2024-02-05 NOTE — ED ADULT TRIAGE NOTE - CHIEF COMPLAINT QUOTE
pt presents to ER c/o itching to R side of abdomen and R side of back for the past few days. states "I think I have shingles." no rash noted. denies chest pain, sob, or fevers.

## 2024-02-05 NOTE — ED ADULT NURSE NOTE - OBJECTIVE STATEMENT
89 yo c/o itching, concerned for shingles. Upon evaluation, there is no rash. Provider to discharge patient.

## 2024-02-05 NOTE — ED PROVIDER NOTE - NSFOLLOWUPINSTRUCTIONS_ED_ALL_ED_FT
Pruritus  Pruritus is an itchy feeling on the skin. One of the most common causes is dry skin, but many different things can cause itching. Most cases of itching do not require medical attention. Sometimes itchy skin can turn into a rash or a secondary infection.    Follow these instructions at home:  Skin care    A person applying skin lotion moisturizer to the hands.  Do not use scented soaps, detergents, perfumes, and cosmetic products. Instead, use gentle, unscented versions of these items.  Apply moisturizing creams to your skin frequently, at least twice daily. Apply immediately after bathing while skin is still wet.  Take medicines or apply medicated creams only as told by your health care provider. This may include:  Corticosteroid cream or topical calcineurin inhibitor.  Anti-itch lotions containing urea, camphor, or menthol.  Oral antihistamines.  Do not take hot showers or baths, which can make itching worse. A short, cool shower may help with itching as long as you apply moisturizing lotion after the shower.  Apply a cool, wet cloth (cool compress) to the affected areas.  You may take lukewarm baths with one of the following:  Epsom salts. You can get these at your local pharmacy or grocery store. Follow the instructions on the packaging.  Baking soda. Pour a small amount into the bath as told by your health care provider.  Colloidal oatmeal. You can get this at your local pharmacy or grocery store. Follow the instructions on the packaging.  Do not scratch your skin.  General instructions    Avoid wearing tight clothes.  Keep a journal to help find out what is causing your itching. Write down:  What you eat and drink.  What cosmetic products you use.  What soaps or detergents you use.  What you wear, including jewelry.  Use a humidifier. This keeps the air moist, which helps to prevent dry skin.  Be aware of any changes in your itchiness. Tell your health care provider about any changes.  Contact a health care provider if:  The itching does not go away after several days.  You notice redness, warmth, or drainage on the skin where you have scratched.  You are unusually thirsty or urinating more than normal.  Your skin tingles or feels numb.  Your skin or the white parts of your eyes turn yellow (jaundice).  You feel weak.  You have any of the following:  Night sweats.  Tiredness (fatigue).  Weight loss.  Abdominal pain.  Summary  Pruritus is an itchy feeling on the skin. One of the most common causes is dry skin, but many different conditions and factors can cause itching.  Apply moisturizing creams to your skin frequently, at least twice daily. Apply immediately after bathing while skin is still wet.  Take medicines or apply medicated creams only as told by your health care provider.  Do not take hot showers or baths. Do not use scented soaps, detergents, perfumes, or cosmetic products.  Keep a journal to help find out what is causing your itching.  This information is not intended to replace advice given to you by your health care provider. Make sure you discuss any questions you have with your health care provider.

## 2024-02-05 NOTE — ED PROVIDER NOTE - SKIN, MLM
Skin normal color for race, warm, dry and intact. No evidence of rash. b/l abdomen and low back with no lesions or rash, no vesicles

## 2024-02-05 NOTE — ED PROVIDER NOTE - PATIENT PORTAL LINK FT
You can access the FollowMyHealth Patient Portal offered by [facility] by registering at the following website: [email or web address]. By joining [employer]’s FollowMyHealth portal, you will also be able to view your health information using other applications (apps) compatible with our system.

## 2024-02-05 NOTE — ED PROVIDER NOTE - ATTENDING APP SHARED VISIT CONTRIBUTION OF CARE
89 y/o f presents c/o itchiness to both sides of abdomen and low back for the past week.  Pt is with a family member who reports they haven't put anything on the area because they haven't seen a rash, pt was concerned for shingles and wanted to come to ED.  Denies fever, chills, all other ROS negative.    No rash on exam, discussed can use moisturizing cream, aquafor, benadryl at night if very itchy.  Recommend to f/u with pmd, return to ED if a rash develops or pt's sx worsen.    Addendum to attending statement: I have reviewed the ACP note and agree with the history, exam, and plan of care. I  was available to PA   as a supervising provider if needed. PA given opportunity to ask questions and request further evaluation / care.    Pt without evidence of shingles, target lesions, or concerning rash.  Pt understands dc instructions with aquaphor and mositurizing cream.

## 2024-02-05 NOTE — ED ADULT NURSE NOTE - NSFALLHARMRISKINTERV_ED_ALL_ED

## 2024-02-05 NOTE — ED PROVIDER NOTE - CLINICAL SUMMARY MEDICAL DECISION MAKING FREE TEXT BOX
89 y/o f presents c/o pruritis to b/l abdomen and back x 1 week.  No rash on exam, discussed can use moisturizing cream, aquafor, benadryl at night if very itchy.  Recommend to f/u with pmd, return to ED if a rash develops or pt's sx worsen.

## 2024-02-05 NOTE — ED PROVIDER NOTE - OBJECTIVE STATEMENT
89 y/o f presents c/o itchiness to both sides of abdomen and low back for the past week.  Pt is with a family member who reports they haven't put anything on the area because they haven't seen a rash, pt was concerned for shingles and wanted to come to ED.  Denies fever, chills, all other ROS negative.

## 2024-02-08 DIAGNOSIS — L29.8 OTHER PRURITUS: ICD-10-CM

## 2024-02-09 NOTE — PHYSICAL THERAPY INITIAL EVALUATION ADULT - ADDITIONAL COMMENTS
MC- Please read and sign for MT. Thank you!    MT- Please advise  
SONU EOS to MT's nurse, Marguerite, on 2/9/2024    
independent within home despite being visually impaired, apartment, elevator, no steps

## 2024-06-26 ENCOUNTER — LABORATORY RESULT (OUTPATIENT)
Age: 89
End: 2024-06-26

## 2024-06-26 ENCOUNTER — APPOINTMENT (OUTPATIENT)
Dept: NEPHROLOGY | Facility: CLINIC | Age: 89
End: 2024-06-26
Payer: MEDICARE

## 2024-06-26 VITALS — HEART RATE: 57 BPM | DIASTOLIC BLOOD PRESSURE: 74 MMHG | SYSTOLIC BLOOD PRESSURE: 192 MMHG

## 2024-06-26 VITALS
DIASTOLIC BLOOD PRESSURE: 72 MMHG | BODY MASS INDEX: 19.76 KG/M2 | WEIGHT: 98 LBS | HEIGHT: 59 IN | SYSTOLIC BLOOD PRESSURE: 190 MMHG

## 2024-06-26 DIAGNOSIS — Z78.9 OTHER SPECIFIED HEALTH STATUS: ICD-10-CM

## 2024-06-26 DIAGNOSIS — N17.9 ACUTE KIDNEY FAILURE, UNSPECIFIED: ICD-10-CM

## 2024-06-26 DIAGNOSIS — R63.4 ABNORMAL WEIGHT LOSS: ICD-10-CM

## 2024-06-26 DIAGNOSIS — N18.9 ACUTE KIDNEY FAILURE, UNSPECIFIED: ICD-10-CM

## 2024-06-26 DIAGNOSIS — D64.9 ANEMIA, UNSPECIFIED: ICD-10-CM

## 2024-06-26 DIAGNOSIS — I10 ESSENTIAL (PRIMARY) HYPERTENSION: ICD-10-CM

## 2024-06-26 PROCEDURE — 99205 OFFICE O/P NEW HI 60 MIN: CPT

## 2024-06-26 RX ORDER — LOVASTATIN 40 MG/1
40 TABLET ORAL
Refills: 0 | Status: ACTIVE | COMMUNITY
Start: 2024-06-26

## 2024-06-26 RX ORDER — NEBIVOLOL 20 MG/1
20 TABLET ORAL DAILY
Refills: 0 | Status: ACTIVE | COMMUNITY
Start: 2024-06-26

## 2024-06-26 RX ORDER — FAMCICLOVIR 125 MG/1
125 TABLET, FILM COATED ORAL
Refills: 0 | Status: ACTIVE | COMMUNITY
Start: 2024-06-26

## 2024-06-26 RX ORDER — AMLODIPINE AND VALSARTAN 10; 320 MG/1; MG/1
10-320 TABLET, FILM COATED ORAL DAILY
Qty: 30 | Refills: 5 | Status: ACTIVE | COMMUNITY
Start: 2024-06-26

## 2024-06-26 RX ORDER — FERROUS GLUCONATE 324(38)MG
324 (38 FE) TABLET ORAL TWICE DAILY
Refills: 0 | Status: ACTIVE | COMMUNITY
Start: 2024-06-26

## 2024-06-26 RX ORDER — FUROSEMIDE 20 MG/1
20 TABLET ORAL
Refills: 0 | Status: ACTIVE | COMMUNITY
Start: 2024-06-26

## 2024-06-27 ENCOUNTER — APPOINTMENT (OUTPATIENT)
Dept: ULTRASOUND IMAGING | Facility: HOSPITAL | Age: 89
End: 2024-06-27

## 2024-06-27 ENCOUNTER — OUTPATIENT (OUTPATIENT)
Dept: OUTPATIENT SERVICES | Facility: HOSPITAL | Age: 89
LOS: 1 days | End: 2024-06-27
Payer: MEDICARE

## 2024-06-27 LAB
ALBUMIN SERPL ELPH-MCNC: 3.7 G/DL
ALP BLD-CCNC: 75 U/L
ALT SERPL-CCNC: 7 U/L
ANION GAP SERPL CALC-SCNC: 15 MMOL/L
APPEARANCE: CLEAR
AST SERPL-CCNC: 25 U/L
BACTERIA: NEGATIVE /HPF
BILIRUB SERPL-MCNC: 0.3 MG/DL
BILIRUBIN URINE: NEGATIVE
BLOOD URINE: NEGATIVE
BUN SERPL-MCNC: 31 MG/DL
C3 SERPL-MCNC: 122 MG/DL
C4 SERPL-MCNC: 33 MG/DL
CALCIUM SERPL-MCNC: 8.7 MG/DL
CAST: 2 /LPF
CHLORIDE SERPL-SCNC: 106 MMOL/L
CO2 SERPL-SCNC: 20 MMOL/L
COLOR: YELLOW
CREAT SERPL-MCNC: 2.43 MG/DL
CREAT SPEC-SCNC: 75 MG/DL
CREAT SPEC-SCNC: 75 MG/DL
CREAT/PROT UR: 3.8 RATIO
EGFR: 18 ML/MIN/1.73M2
EPITHELIAL CELLS: 4 /HPF
FERRITIN SERPL-MCNC: 71 NG/ML
FOLATE SERPL-MCNC: >20 NG/ML
GLUCOSE QUALITATIVE U: NEGATIVE MG/DL
GLUCOSE SERPL-MCNC: 97 MG/DL
HAPTOGLOB SERPL-MCNC: 202 MG/DL
HCT VFR BLD CALC: 29 %
HGB BLD-MCNC: 9 G/DL
IRON SATN MFR SERPL: 16 %
IRON SERPL-MCNC: 42 UG/DL
KETONES URINE: NEGATIVE MG/DL
LDH SERPL-CCNC: 241 U/L
LEUKOCYTE ESTERASE URINE: NEGATIVE
MAGNESIUM SERPL-MCNC: 1.8 MG/DL
MCHC RBC-ENTMCNC: 29.5 PG
MCHC RBC-ENTMCNC: 31 GM/DL
MCV RBC AUTO: 95.1 FL
MICROALBUMIN 24H UR DL<=1MG/L-MCNC: 126.3 MG/DL
MICROALBUMIN/CREAT 24H UR-RTO: 1676 MG/G
MICROSCOPIC-UA: NORMAL
NITRITE URINE: NEGATIVE
PH URINE: 6.5
PHOSPHATE SERPL-MCNC: 4.3 MG/DL
PLATELET # BLD AUTO: 279 K/UL
POTASSIUM SERPL-SCNC: 4.1 MMOL/L
PROT SERPL-MCNC: 7.9 G/DL
PROT UR-MCNC: 286 MG/DL
PROTEIN URINE: 300 MG/DL
RBC # BLD: 3.05 M/UL
RBC # FLD: 14.6 %
RED BLOOD CELLS URINE: 1 /HPF
SODIUM SERPL-SCNC: 141 MMOL/L
SPECIFIC GRAVITY URINE: 1.01
TIBC SERPL-MCNC: 254 UG/DL
UIBC SERPL-MCNC: 212 UG/DL
URATE SERPL-MCNC: 6 MG/DL
UROBILINOGEN URINE: 0.2 MG/DL
VIT B12 SERPL-MCNC: 408 PG/ML
WBC # FLD AUTO: 5.4 K/UL
WHITE BLOOD CELLS URINE: 1 /HPF

## 2024-06-27 PROCEDURE — 76700 US EXAM ABDOM COMPLETE: CPT

## 2024-06-27 PROCEDURE — 76700 US EXAM ABDOM COMPLETE: CPT | Mod: 26

## 2024-06-29 ENCOUNTER — TRANSCRIPTION ENCOUNTER (OUTPATIENT)
Age: 89
End: 2024-06-29

## 2024-06-29 LAB
ALBUMIN MFR SERPL ELPH: 41.2 %
ALBUMIN SERPL-MCNC: 3.3 G/DL
ALBUMIN/GLOB SERPL: 0.7 RATIO
ALPHA1 GLOB MFR SERPL ELPH: 5 %
ALPHA1 GLOB SERPL ELPH-MCNC: 0.4 G/DL
ALPHA2 GLOB MFR SERPL ELPH: 9.8 %
ALPHA2 GLOB SERPL ELPH-MCNC: 0.8 G/DL
B-GLOBULIN MFR SERPL ELPH: 15.7 %
B-GLOBULIN SERPL ELPH-MCNC: 1.2 G/DL
DEPRECATED KAPPA LC FREE/LAMBDA SER: 0.6 RATIO
GAMMA GLOB FLD ELPH-MCNC: 2.2 G/DL
GAMMA GLOB MFR SERPL ELPH: 28.3 %
IGA SER QL IEP: 833 MG/DL
IGG SER QL IEP: 2245 MG/DL
IGM SER QL IEP: 54 MG/DL
INTERPRETATION SERPL IEP-IMP: NORMAL
KAPPA LC CSF-MCNC: 33.49 MG/DL
KAPPA LC SERPL-MCNC: 20.01 MG/DL
M PROTEIN MFR SERPL ELPH: 10.8 %
M PROTEIN SPEC IFE-MCNC: NORMAL
MONOCLON BAND OBS SERPL: 0.9 G/DL
PROT SERPL-MCNC: 7.9 G/DL
PROT SERPL-MCNC: 7.9 G/DL

## 2024-07-01 LAB — ALDOSTERONE SERUM: 27.7 NG/DL

## 2024-07-10 ENCOUNTER — LABORATORY RESULT (OUTPATIENT)
Age: 89
End: 2024-07-10

## 2024-07-11 LAB
ALBUMIN SERPL ELPH-MCNC: 3.6 G/DL
ALP BLD-CCNC: 78 U/L
ALT SERPL-CCNC: 9 U/L
ANION GAP SERPL CALC-SCNC: 18 MMOL/L
APPEARANCE: CLEAR
AST SERPL-CCNC: 23 U/L
BACTERIA: NEGATIVE /HPF
BILIRUB SERPL-MCNC: 0.2 MG/DL
BILIRUBIN URINE: NEGATIVE
BLOOD URINE: NEGATIVE
BUN SERPL-MCNC: 32 MG/DL
CALCIUM SERPL-MCNC: 8.6 MG/DL
CAST: 0 /LPF
CHLORIDE SERPL-SCNC: 105 MMOL/L
CO2 SERPL-SCNC: 18 MMOL/L
COLOR: YELLOW
CREAT SERPL-MCNC: 2.36 MG/DL
CREAT SPEC-SCNC: 38 MG/DL
CREAT/PROT UR: 5.3 RATIO
EGFR: 19 ML/MIN/1.73M2
EPITHELIAL CELLS: 3 /HPF
GLUCOSE QUALITATIVE U: NEGATIVE MG/DL
GLUCOSE SERPL-MCNC: 109 MG/DL
HBV CORE IGG+IGM SER QL: NONREACTIVE
HBV SURFACE AG SER QL: NONREACTIVE
HCT VFR BLD CALC: 27.3 %
HCV AB SER QL: NONREACTIVE
HCV S/CO RATIO: 0.37 S/CO
HGB BLD-MCNC: 8.2 G/DL
KETONES URINE: NEGATIVE MG/DL
LEUKOCYTE ESTERASE URINE: NEGATIVE
MCHC RBC-ENTMCNC: 29.4 PG
MCHC RBC-ENTMCNC: 30 GM/DL
MCV RBC AUTO: 97.8 FL
MICROSCOPIC-UA: NORMAL
NITRITE URINE: NEGATIVE
PH URINE: 6.5
PLATELET # BLD AUTO: 355 K/UL
POTASSIUM SERPL-SCNC: 3.6 MMOL/L
PROT SERPL-MCNC: 8.2 G/DL
PROT UR-MCNC: 200 MG/DL
PROTEIN URINE: 300 MG/DL
RBC # BLD: 2.79 M/UL
RBC # FLD: 14.8 %
RED BLOOD CELLS URINE: 0 /HPF
SODIUM SERPL-SCNC: 141 MMOL/L
SPECIFIC GRAVITY URINE: 1.01
UROBILINOGEN URINE: 0.2 MG/DL
WBC # FLD AUTO: 6.28 K/UL
WHITE BLOOD CELLS URINE: 0 /HPF

## 2024-07-12 LAB
M TB IFN-G BLD-IMP: ABNORMAL
MYELOPEROXIDASE AB SER QL IA: 68.9 UNITS
MYELOPEROXIDASE CELLS FLD QL: POSITIVE
QUANTIFERON TB PLUS MITOGEN MINUS NIL: 0.45 IU/ML
QUANTIFERON TB PLUS NIL: 0.06 IU/ML
QUANTIFERON TB PLUS TB1 MINUS NIL: 0.01 IU/ML
QUANTIFERON TB PLUS TB2 MINUS NIL: 0.01 IU/ML

## 2024-08-08 ENCOUNTER — APPOINTMENT (OUTPATIENT)
Dept: NEPHROLOGY | Facility: CLINIC | Age: 89
End: 2024-08-08

## 2024-08-08 PROBLEM — R76.8 P-ANCA AND MPO ANTIBODIES POSITIVE: Status: ACTIVE | Noted: 2024-08-08

## 2024-08-08 PROBLEM — E11.21 DIABETIC NEPHROPATHY ASSOCIATED WITH TYPE 2 DIABETES MELLITUS: Status: ACTIVE | Noted: 2024-08-08

## 2024-08-08 PROBLEM — E11.3593 PROLIFERATIVE DIABETIC RETINOPATHY OF BOTH EYES ASSOCIATED WITH TYPE 2 DIABETES MELLITUS, UNSPECIFIED PROLIFERATIVE RETINOPATHY TYPE: Status: RESOLVED | Noted: 2024-08-08 | Resolved: 2024-08-08

## 2024-08-08 PROBLEM — R80.9 PROTEINURIA, UNSPECIFIED TYPE: Status: ACTIVE | Noted: 2024-08-08

## 2024-08-08 PROCEDURE — 99215 OFFICE O/P EST HI 40 MIN: CPT

## 2024-08-08 NOTE — ASSESSMENT
[FreeTextEntry1] : AUGUSTUS / progressed CKD over past year -- likely underlying DM nephropathy but not obvious cause of progression-- seems may have uncontrolled HTN whcich could have contributed but  no symptoms . has MGUS but nl kappa/ lambda ratio-- though of note on low side for CKD ( but < 0.37 has been cited as low cut off for CKD pts - Edmundo--so still within range) -- will jenna jean baptiste strongly pos pANCA / MPO so consider ANCA vasculitis - however has no microhematuria x2 UAs unfortunately unlikely to be a kidney bx candidate based on her age of 89 (!), debility, , anemia, uncontrolled HTN, relatively small kidneys all make pt high risk last creat slt lower  need to decide on empiric rituximab for possible  AASV-- may do if creat rising again and colleen if microhematuria-- reviewed potential risks vs benefits also advised follow home BP -- if rather high -- > 150 systolic -- may add agent - perhaps cautious spironolactone as has evidence hyperaldosteronism with very high iliana/ PRA ratio and low K despite sig CKD and on ARB  aaron also jenna jean baptiste re MGUS and anemia touch base when labs back re lana

## 2024-08-08 NOTE — HISTORY OF PRESENT ILLNESS
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

## 2024-08-08 NOTE — PHYSICAL EXAM
[General Appearance - Alert] : alert [General Appearance - In No Acute Distress] : in no acute distress [Sclera] : the sclera and conjunctiva were normal [Jugular Venous Distention Increased] : there was no jugular-venous distention [Auscultation Breath Sounds / Voice Sounds] : lungs were clear to auscultation bilaterally [Heart Rate And Rhythm] : heart rate was normal and rhythm regular [Heart Sounds] : normal S1 and S2 [Systolic grade ___/6] : A grade [unfilled]/6 systolic murmur was heard. [Edema] : there was no peripheral edema [Abdomen Soft] : soft [Abdomen Tenderness] : non-tender [No CVA Tenderness] : no ~M costovertebral angle tenderness [Involuntary Movements] : no involuntary movements were seen [] : no rash [No Focal Deficits] : no focal deficits [Oriented To Time, Place, And Person] : oriented to person, place, and time [Affect] : the affect was normal

## 2024-08-08 NOTE — HISTORY OF PRESENT ILLNESS
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

## 2024-08-08 NOTE — PHYSICAL EXAM
[General Appearance - Alert] : alert [General Appearance - In No Acute Distress] : in no acute distress [Sclera] : the sclera and conjunctiva were normal [Jugular Venous Distention Increased] : there was no jugular-venous distention [Auscultation Breath Sounds / Voice Sounds] : lungs were clear to auscultation bilaterally [Heart Rate And Rhythm] : heart rate was normal and rhythm regular [Heart Sounds] : normal S1 and S2 [Systolic grade ___/6] : A grade [unfilled]/6 systolic murmur was heard. [Abdomen Soft] : soft [Edema] : there was no peripheral edema [Abdomen Tenderness] : non-tender [No CVA Tenderness] : no ~M costovertebral angle tenderness [Involuntary Movements] : no involuntary movements were seen [] : no rash [No Focal Deficits] : no focal deficits [Oriented To Time, Place, And Person] : oriented to person, place, and time [Affect] : the affect was normal

## 2024-10-08 ENCOUNTER — APPOINTMENT (OUTPATIENT)
Dept: NEPHROLOGY | Facility: CLINIC | Age: 89
End: 2024-10-08
Payer: MEDICARE

## 2024-10-08 VITALS
DIASTOLIC BLOOD PRESSURE: 72 MMHG | WEIGHT: 101 LBS | BODY MASS INDEX: 20.4 KG/M2 | HEART RATE: 54 BPM | SYSTOLIC BLOOD PRESSURE: 168 MMHG

## 2024-10-08 DIAGNOSIS — D64.9 ANEMIA, UNSPECIFIED: ICD-10-CM

## 2024-10-08 DIAGNOSIS — R80.9 PROTEINURIA, UNSPECIFIED: ICD-10-CM

## 2024-10-08 DIAGNOSIS — R76.8 OTHER SPECIFIED ABNORMAL IMMUNOLOGICAL FINDINGS IN SERUM: ICD-10-CM

## 2024-10-08 DIAGNOSIS — N17.9 ACUTE KIDNEY FAILURE, UNSPECIFIED: ICD-10-CM

## 2024-10-08 DIAGNOSIS — I10 ESSENTIAL (PRIMARY) HYPERTENSION: ICD-10-CM

## 2024-10-08 DIAGNOSIS — E11.21 TYPE 2 DIABETES MELLITUS WITH DIABETIC NEPHROPATHY: ICD-10-CM

## 2024-10-08 DIAGNOSIS — N18.9 ACUTE KIDNEY FAILURE, UNSPECIFIED: ICD-10-CM

## 2024-10-08 LAB
ALBUMIN SERPL ELPH-MCNC: 3.5 G/DL
ALP BLD-CCNC: 69 U/L
ALT SERPL-CCNC: 6 U/L
ANION GAP SERPL CALC-SCNC: 15 MMOL/L
AST SERPL-CCNC: 18 U/L
BILIRUB SERPL-MCNC: 0.3 MG/DL
BUN SERPL-MCNC: 33 MG/DL
CALCIUM SERPL-MCNC: 8.6 MG/DL
CHLORIDE SERPL-SCNC: 109 MMOL/L
CO2 SERPL-SCNC: 18 MMOL/L
CREAT SERPL-MCNC: 2.99 MG/DL
EGFR: 14 ML/MIN/1.73M2
GLUCOSE SERPL-MCNC: 180 MG/DL
HCT VFR BLD CALC: 24.6 %
HGB BLD-MCNC: 7.8 G/DL
MCHC RBC-ENTMCNC: 30.4 PG
MCHC RBC-ENTMCNC: 31.7 GM/DL
MCV RBC AUTO: 95.7 FL
PHOSPHATE SERPL-MCNC: 4.8 MG/DL
PLATELET # BLD AUTO: 258 K/UL
POTASSIUM SERPL-SCNC: 4 MMOL/L
PROT SERPL-MCNC: 7.6 G/DL
RBC # BLD: 2.57 M/UL
RBC # FLD: 14.1 %
SODIUM SERPL-SCNC: 142 MMOL/L
WBC # FLD AUTO: 4.5 K/UL

## 2024-10-08 PROCEDURE — G2211 COMPLEX E/M VISIT ADD ON: CPT

## 2024-10-08 PROCEDURE — 99215 OFFICE O/P EST HI 40 MIN: CPT

## 2024-10-08 RX ORDER — CARVEDILOL 6.25 MG/1
6.25 TABLET, FILM COATED ORAL
Qty: 180 | Refills: 3 | Status: ACTIVE | COMMUNITY
Start: 2024-10-08

## 2024-10-08 RX ORDER — HYDRALAZINE HYDROCHLORIDE 50 MG/1
50 TABLET ORAL TWICE DAILY
Qty: 180 | Refills: 3 | Status: ACTIVE | COMMUNITY
Start: 2024-10-08

## 2024-10-08 RX ORDER — HYDRALAZINE HYDROCHLORIDE 25 MG/1
25 TABLET ORAL
Qty: 180 | Refills: 2 | Status: ACTIVE | COMMUNITY
Start: 2024-10-08

## 2024-10-08 RX ORDER — TORSEMIDE 5 MG/1
5 TABLET ORAL
Qty: 60 | Refills: 5 | Status: ACTIVE | COMMUNITY
Start: 2024-10-08 | End: 1900-01-01

## 2024-10-08 RX ORDER — LOVASTATIN 40 MG/1
40 TABLET ORAL
Refills: 0 | Status: ACTIVE | COMMUNITY
Start: 2024-10-08

## 2024-10-09 LAB
MYELOPEROXIDASE AB SER QL IA: 66.8 UNITS
MYELOPEROXIDASE CELLS FLD QL: POSITIVE

## 2024-10-14 ENCOUNTER — NON-APPOINTMENT (OUTPATIENT)
Age: 89
End: 2024-10-14

## 2024-10-30 ENCOUNTER — APPOINTMENT (OUTPATIENT)
Dept: NEPHROLOGY | Facility: CLINIC | Age: 89
End: 2024-10-30

## 2024-11-14 NOTE — PROGRESS NOTE ADULT - PROBLEM SELECTOR PROBLEM 4
Narda Malloy is a 68 y.o. female on day 33 of admission presenting with Unresponsive.      Subjective   No issues per patient, sodium improving, creatinine trending down to 1.59.  Net -1.4 L yesterday.       Objective          Vitals 24HR  Heart Rate:  [58-95]   Temp:  [36.1 °C (97 °F)-37.9 °C (100.2 °F)]   Resp:  [12-20]   BP: ()/(44-82)   Weight:  [114 kg (250 lb 10.6 oz)]   SpO2:  [87 %-100 %]       Intake/Output last 3 Shifts:    Intake/Output Summary (Last 24 hours) at 11/14/2024 1449  Last data filed at 11/14/2024 1130  Gross per 24 hour   Intake 240 ml   Output 1900 ml   Net -1660 ml       Physical Exam  Constitutional:       General: She is awake and alert.  Neck:      Comments: Trach present.  Right IJ tunneled dialysis catheter present  Cardiovascular:      Heart sounds:      No friction rub.   Pulmonary:      Comments: Fairly clear breath sounds, mechanically ventilated through trach  Abdominal:      General: Bowel sounds are normal.      Palpations: Abdomen is soft.      Tenderness: There is no guarding or rebound.   Musculoskeletal:      Comments: Trace edema    Relevant Results  Results for orders placed or performed during the hospital encounter of 10/12/24 (from the past 24 hours)   POCT GLUCOSE   Result Value Ref Range    POCT Glucose 168 (H) 74 - 99 mg/dL   POCT GLUCOSE   Result Value Ref Range    POCT Glucose 158 (H) 74 - 99 mg/dL   POCT GLUCOSE   Result Value Ref Range    POCT Glucose 153 (H) 74 - 99 mg/dL   POCT GLUCOSE   Result Value Ref Range    POCT Glucose 106 (H) 74 - 99 mg/dL   CBC and Auto Differential   Result Value Ref Range    WBC 6.7 4.4 - 11.3 x10*3/uL    nRBC 0.0 0.0 - 0.0 /100 WBCs    RBC 2.68 (L) 4.00 - 5.20 x10*6/uL    Hemoglobin 8.2 (L) 12.0 - 16.0 g/dL    Hematocrit 24.1 (L) 36.0 - 46.0 %    MCV 90 80 - 100 fL    MCH 30.6 26.0 - 34.0 pg    MCHC 34.0 32.0 - 36.0 g/dL    RDW 18.2 (H) 11.5 - 14.5 %    Platelets 343 150 - 450 x10*3/uL    Neutrophils % 77.7 40.0 - 80.0 %     Immature Granulocytes %, Automated 0.9 0.0 - 0.9 %    Lymphocytes % 14.0 13.0 - 44.0 %    Monocytes % 5.9 2.0 - 10.0 %    Eosinophils % 0.9 0.0 - 6.0 %    Basophils % 0.6 0.0 - 2.0 %    Neutrophils Absolute 5.17 1.20 - 7.70 x10*3/uL    Immature Granulocytes Absolute, Automated 0.06 0.00 - 0.70 x10*3/uL    Lymphocytes Absolute 0.93 (L) 1.20 - 4.80 x10*3/uL    Monocytes Absolute 0.39 0.10 - 1.00 x10*3/uL    Eosinophils Absolute 0.06 0.00 - 0.70 x10*3/uL    Basophils Absolute 0.04 0.00 - 0.10 x10*3/uL   Magnesium   Result Value Ref Range    Magnesium 1.94 1.60 - 2.40 mg/dL   Renal function panel   Result Value Ref Range    Glucose 106 (H) 74 - 99 mg/dL    Sodium 132 (L) 136 - 145 mmol/L    Potassium 3.4 (L) 3.5 - 5.3 mmol/L    Chloride 92 (L) 98 - 107 mmol/L    Bicarbonate 30 21 - 32 mmol/L    Anion Gap 13 10 - 20 mmol/L    Urea Nitrogen 65 (H) 6 - 23 mg/dL    Creatinine 1.59 (H) 0.50 - 1.05 mg/dL    eGFR 35 (L) >60 mL/min/1.73m*2    Calcium 8.0 (L) 8.6 - 10.3 mg/dL    Phosphorus 4.6 2.5 - 4.9 mg/dL    Albumin 2.3 (L) 3.4 - 5.0 g/dL   POCT GLUCOSE   Result Value Ref Range    POCT Glucose 122 (H) 74 - 99 mg/dL   POCT GLUCOSE   Result Value Ref Range    POCT Glucose 150 (H) 74 - 99 mg/dL     *Note: Due to a large number of results and/or encounters for the requested time period, some results have not been displayed. A complete set of results can be found in Results Review.            Assessment/Plan   Acute kidney injury - Cr improving, no further needs for dialysis, will have the tunneled dialysis catheter removed today, discussed with ICU team   hyponatremia mild, in the setting of fluid overload, improving  Edema much improved, would recommend transitioning at this point to Lasix 60 mg p.o. once a day with hold parameters to avoid hypotension and potassium 20 mEq once per day, this is being communicated to ICU team.  Would recommend hold lasix for systolic blood pressure less than 105  Pneumonia continue with  antibiotic therapy infectious disease  Diabetes mellitus type 2  Malnutrition continue with tube feeding  Lower back surgery site infection  Anemia transfuse when hemoglobin less than 7  Acute respiratory failure plan to continue the ventilator through a tracheostomy    Nelia Cheung MD       Elevated serum creatinine

## 2024-11-22 ENCOUNTER — NON-APPOINTMENT (OUTPATIENT)
Age: 89
End: 2024-11-22

## 2024-12-19 ENCOUNTER — NON-APPOINTMENT (OUTPATIENT)
Age: 88
End: 2024-12-19

## 2024-12-19 ENCOUNTER — APPOINTMENT (OUTPATIENT)
Dept: NEPHROLOGY | Facility: CLINIC | Age: 88
End: 2024-12-19
Payer: MEDICARE

## 2024-12-19 VITALS — DIASTOLIC BLOOD PRESSURE: 60 MMHG | HEART RATE: 54 BPM | SYSTOLIC BLOOD PRESSURE: 162 MMHG

## 2024-12-19 DIAGNOSIS — D64.9 ANEMIA, UNSPECIFIED: ICD-10-CM

## 2024-12-19 DIAGNOSIS — R76.8 OTHER SPECIFIED ABNORMAL IMMUNOLOGICAL FINDINGS IN SERUM: ICD-10-CM

## 2024-12-19 DIAGNOSIS — I10 ESSENTIAL (PRIMARY) HYPERTENSION: ICD-10-CM

## 2024-12-19 DIAGNOSIS — N25.81 SECONDARY HYPERPARATHYROIDISM OF RENAL ORIGIN: ICD-10-CM

## 2024-12-19 DIAGNOSIS — E11.21 TYPE 2 DIABETES MELLITUS WITH DIABETIC NEPHROPATHY: ICD-10-CM

## 2024-12-19 DIAGNOSIS — N18.5 CHRONIC KIDNEY DISEASE, STAGE 5: ICD-10-CM

## 2024-12-19 LAB
25(OH)D3 SERPL-MCNC: 27.2 NG/ML
ALBUMIN SERPL ELPH-MCNC: 3.7 G/DL
ALP BLD-CCNC: 61 U/L
ALT SERPL-CCNC: 6 U/L
ANION GAP SERPL CALC-SCNC: 14 MMOL/L
APPEARANCE: CLEAR
AST SERPL-CCNC: 24 U/L
BACTERIA: NEGATIVE /HPF
BILIRUB SERPL-MCNC: 0.2 MG/DL
BILIRUBIN URINE: NEGATIVE
BLOOD URINE: NEGATIVE
BUN SERPL-MCNC: 39 MG/DL
CALCIUM SERPL-MCNC: 8.9 MG/DL
CALCIUM SERPL-MCNC: 8.9 MG/DL
CAST: 1 /LPF
CHLORIDE SERPL-SCNC: 111 MMOL/L
CO2 SERPL-SCNC: 17 MMOL/L
COLOR: YELLOW
CREAT SERPL-MCNC: 3.19 MG/DL
CREAT SPEC-SCNC: 126 MG/DL
CREAT/PROT UR: 1.1 RATIO
CYSTATIN C SERPL-MCNC: 3.35 MG/L
EGFR: 13 ML/MIN/1.73M2
EPITHELIAL CELLS: 2 /HPF
GFR/BSA.PRED SERPLBLD CYS-BASED-ARV: 13 ML/MIN/1.73M2
GLUCOSE QUALITATIVE U: NEGATIVE MG/DL
GLUCOSE SERPL-MCNC: 130 MG/DL
HCT VFR BLD CALC: 29.5 %
HGB BLD-MCNC: 9 G/DL
KETONES URINE: NEGATIVE MG/DL
LEUKOCYTE ESTERASE URINE: ABNORMAL
MAGNESIUM SERPL-MCNC: 1.8 MG/DL
MCHC RBC-ENTMCNC: 29.7 PG
MCHC RBC-ENTMCNC: 30.5 G/DL
MCV RBC AUTO: 97.4 FL
MICROSCOPIC-UA: NORMAL
NITRITE URINE: NEGATIVE
PARATHYROID HORMONE INTACT: 169 PG/ML
PH URINE: 6
PHOSPHATE SERPL-MCNC: 5.1 MG/DL
PLATELET # BLD AUTO: 233 K/UL
POTASSIUM SERPL-SCNC: 3.7 MMOL/L
PROT SERPL-MCNC: 7.7 G/DL
PROT UR-MCNC: 135 MG/DL
PROTEIN URINE: 100 MG/DL
RBC # BLD: 3.03 M/UL
RBC # FLD: 14.6 %
RED BLOOD CELLS URINE: 0 /HPF
SODIUM SERPL-SCNC: 143 MMOL/L
SPECIFIC GRAVITY URINE: 1.02
URATE SERPL-MCNC: 7.1 MG/DL
UROBILINOGEN URINE: 0.2 MG/DL
WBC # FLD AUTO: 4.12 K/UL
WHITE BLOOD CELLS URINE: 4 /HPF

## 2024-12-19 PROCEDURE — G2211 COMPLEX E/M VISIT ADD ON: CPT

## 2024-12-19 PROCEDURE — 99215 OFFICE O/P EST HI 40 MIN: CPT

## 2024-12-19 RX ORDER — CALCIUM CARBONATE 500 MG/1
500 TABLET, CHEWABLE ORAL 3 TIMES DAILY
Qty: 90 | Refills: 5 | Status: ACTIVE | COMMUNITY
Start: 2024-12-19

## 2024-12-19 RX ORDER — MULTIVIT-MIN/FOLIC/VIT K/LYCOP 400-300MCG
50 MCG TABLET ORAL
Qty: 30 | Refills: 5 | Status: ACTIVE | COMMUNITY
Start: 2024-12-19

## 2024-12-20 LAB
MYELOPEROXIDASE AB SER QL IA: 68.8 UNITS
MYELOPEROXIDASE CELLS FLD QL: POSITIVE

## 2025-01-29 ENCOUNTER — APPOINTMENT (OUTPATIENT)
Dept: OPHTHALMOLOGY | Facility: CLINIC | Age: 89
End: 2025-01-29
Payer: MEDICARE

## 2025-01-29 ENCOUNTER — NON-APPOINTMENT (OUTPATIENT)
Age: 89
End: 2025-01-29

## 2025-01-29 PROCEDURE — 92133 CPTRZD OPH DX IMG PST SGM ON: CPT

## 2025-01-29 PROCEDURE — 92004 COMPRE OPH EXAM NEW PT 1/>: CPT | Mod: 25

## 2025-02-11 ENCOUNTER — APPOINTMENT (OUTPATIENT)
Dept: NEPHROLOGY | Facility: CLINIC | Age: 89
End: 2025-02-11
Payer: MEDICARE

## 2025-02-11 VITALS
DIASTOLIC BLOOD PRESSURE: 67 MMHG | WEIGHT: 105 LBS | SYSTOLIC BLOOD PRESSURE: 178 MMHG | HEART RATE: 59 BPM | BODY MASS INDEX: 21.21 KG/M2

## 2025-02-11 DIAGNOSIS — I10 ESSENTIAL (PRIMARY) HYPERTENSION: ICD-10-CM

## 2025-02-11 DIAGNOSIS — R76.8 OTHER SPECIFIED ABNORMAL IMMUNOLOGICAL FINDINGS IN SERUM: ICD-10-CM

## 2025-02-11 DIAGNOSIS — N18.5 CHRONIC KIDNEY DISEASE, STAGE 5: ICD-10-CM

## 2025-02-11 PROCEDURE — G2211 COMPLEX E/M VISIT ADD ON: CPT

## 2025-02-11 PROCEDURE — 99214 OFFICE O/P EST MOD 30 MIN: CPT

## 2025-02-11 RX ORDER — DOXAZOSIN 1 MG/1
1 TABLET ORAL
Qty: 60 | Refills: 5 | Status: ACTIVE | COMMUNITY
Start: 2025-02-11 | End: 1900-01-01

## 2025-03-26 ENCOUNTER — APPOINTMENT (OUTPATIENT)
Dept: OTOLARYNGOLOGY | Facility: CLINIC | Age: 89
End: 2025-03-26

## 2025-06-02 NOTE — ED ADULT NURSE NOTE - BREATH SOUNDS, MLM
Patient called back to the office stating that she went to the pharmacy and the pharmacy informed her that she did not have any medication that was sent over from PCP. Informed patient that her calls earlier were in regards to needing documents for medication for insurance purposes. Informed patient that she did not state that she needed a refill. Patient stated that she now needs a refill for medication. Informed patient that the turn around time for refills 48 to 72 hrs  
Clear

## 2025-06-09 ENCOUNTER — NON-APPOINTMENT (OUTPATIENT)
Age: 89
End: 2025-06-09

## 2025-08-04 ENCOUNTER — APPOINTMENT (OUTPATIENT)
Dept: NEPHROLOGY | Facility: CLINIC | Age: 89
End: 2025-08-04
Payer: MEDICARE

## 2025-08-04 VITALS
BODY MASS INDEX: 20.8 KG/M2 | SYSTOLIC BLOOD PRESSURE: 196 MMHG | DIASTOLIC BLOOD PRESSURE: 69 MMHG | HEART RATE: 55 BPM | WEIGHT: 103 LBS

## 2025-08-04 DIAGNOSIS — E11.21 TYPE 2 DIABETES MELLITUS WITH DIABETIC NEPHROPATHY: ICD-10-CM

## 2025-08-04 DIAGNOSIS — R76.8 OTHER SPECIFIED ABNORMAL IMMUNOLOGICAL FINDINGS IN SERUM: ICD-10-CM

## 2025-08-04 DIAGNOSIS — N18.5 CHRONIC KIDNEY DISEASE, STAGE 5: ICD-10-CM

## 2025-08-04 DIAGNOSIS — I10 ESSENTIAL (PRIMARY) HYPERTENSION: ICD-10-CM

## 2025-08-04 PROCEDURE — G2211 COMPLEX E/M VISIT ADD ON: CPT

## 2025-08-04 PROCEDURE — 99214 OFFICE O/P EST MOD 30 MIN: CPT
